# Patient Record
Sex: MALE | Race: WHITE | NOT HISPANIC OR LATINO | Employment: OTHER | URBAN - METROPOLITAN AREA
[De-identification: names, ages, dates, MRNs, and addresses within clinical notes are randomized per-mention and may not be internally consistent; named-entity substitution may affect disease eponyms.]

---

## 2017-10-03 ENCOUNTER — ALLIED HEALTH/NURSE VISIT (OUTPATIENT)
Dept: FAMILY MEDICINE | Facility: CLINIC | Age: 67
End: 2017-10-03
Payer: COMMERCIAL

## 2017-10-03 DIAGNOSIS — Z23 NEED FOR PROPHYLACTIC VACCINATION AND INOCULATION AGAINST INFLUENZA: Primary | ICD-10-CM

## 2017-10-03 DIAGNOSIS — Z23 NEED FOR TDAP VACCINATION: ICD-10-CM

## 2017-10-03 PROCEDURE — 90662 IIV NO PRSV INCREASED AG IM: CPT

## 2017-10-03 PROCEDURE — 90715 TDAP VACCINE 7 YRS/> IM: CPT

## 2017-10-03 PROCEDURE — 99207 ZZC NO CHARGE NURSE ONLY: CPT

## 2017-10-03 PROCEDURE — 90471 IMMUNIZATION ADMIN: CPT

## 2017-10-03 PROCEDURE — G0008 ADMIN INFLUENZA VIRUS VAC: HCPCS | Mod: 59

## 2017-10-03 NOTE — PROGRESS NOTES
Injectable Influenza Immunization Documentation    1.  Is the person to be vaccinated sick today?   No    2. Does the person to be vaccinated have an allergy to a component   of the vaccine?   No    3. Has the person to be vaccinated ever had a serious reaction   to influenza vaccine in the past?   No    4. Has the person to be vaccinated ever had Guillain-Barré syndrome?   No    Form completed by Rocio Ness MA

## 2017-10-03 NOTE — MR AVS SNAPSHOT
After Visit Summary   10/3/2017    Tre Swartz    MRN: [id number]           Patient Information     Date Of Birth          1950        Visit Information        Provider Department      10/3/2017 10:00 AM FL NB CMA/LPN [facility]        Today's Diagnoses     Need for prophylactic vaccination and inoculation against influenza    -  1    Need for Tdap vaccination           Follow-ups after your visit        Your next 10 appointments already scheduled     Oct 30, 2017  8:20 AM CDT   PHYSICAL with Fredy Condon MD   [facility]    [address] [place] [id number]   [phone]              Who to contact     If you have questions or need follow up information about today's clinic visit or your schedule please contact [facility] directly at [phone].  Normal or non-critical lab and imaging results will be communicated to you by Endoarthart, letter or phone within 4 business days after the clinic has received the results. If you do not hear from us within 7 days, please contact the clinic through Endoarthart or phone. If you have a critical or abnormal lab result, we will notify you by phone as soon as possible.  Submit refill requests through PictureMenu or call your pharmacy and they will forward the refill request to us. Please allow 3 business days for your refill to be completed.          Additional Information About Your Visit        MyChart Information     PictureMenu gives you secure access to your electronic health record. If you see a primary care provider, you can also send messages to your care team and make appointments. If you have questions, please call your primary care clinic.  If you do not have a primary care provider, please call [phone] and they will assist you.        Care EveryWhere ID     This is your Care EveryWhere ID. This could be used by other organizations to access your  [facility] medical records  [id number]         Blood Pressure from Last 3 Encounters:   03/04/16 110/74   02/27/15 114/76   08/02/13 118/64    Weight from Last 3 Encounters:   03/04/16 191 lb 9.6 oz (86.9 kg)   02/27/15 192 lb 9.6 oz (87.4 kg)   08/02/13 189 lb (85.7 kg)              We Performed the Following     FLU VACCINE, INCREASED ANTIGEN, PRESV FREE, AGE 65+ [26091]     TDAP VACCINE (ADACEL)     Vaccine Administration, Initial [51176]        Primary Care Provider Office Phone # Fax #    Fredy Condon [phone] [address] [place] [address]        Equal Access to Services     MARITZA SAMUEL : Hadii khadra byrneo Sokasi, waaxda luqadaha, qaybta kaalmada adeegyada, heidi britton . So [facility] [phone].    ATENCIÓN: Si habla español, tiene a arndt disposición servicios gratuitos de asistencia lingüística. Llame al [phone].    We comply with applicable federal civil rights laws and [place] laws. We do not discriminate on the basis of race, color, national origin, age, disability, sex, sexual orientation, or gender identity.            Thank you!     Thank you for choosing [facility]  for your care. Our goal is always to provide you with excellent care. Hearing back from our patients is one way we can continue to improve our services. Please take a few minutes to complete the written survey that you may receive in the mail after your visit with us. Thank you!             Your Updated Medication List - Protect others around you: Learn how to safely use, store and throw away your medicines at www.disposemymeds.org.          This list is accurate as of: 10/3/17 10:13 AM.  Always use your most recent med list.                   Brand Name Dispense Instructions for use Diagnosis    aspirin 81 MG tablet      Take 81 mg by mouth daily        MULTIVITAL Tabs     30 tablet    Take 1 tablet by mouth daily        NO ACTIVE MEDICATIONS

## 2017-10-03 NOTE — NURSING NOTE
Prior to injection verified patient identity using patient's name and date of birth.    Screening Questionnaire for Adult Immunization    Are you sick today?   Yes   Do you have allergies to medications, food, a vaccine component or latex?   No   Have you ever had a serious reaction after receiving a vaccination?   No   Do you have a long-term health problem with heart disease, lung disease, asthma, kidney disease, metabolic disease (e.g. diabetes), anemia, or other blood disorder?   No   Do you have cancer, leukemia, HIV/AIDS, or any other immune system problem?   No   In the past 3 months, have you taken medications that affect  your immune system, such as prednisone, other steroids, or anticancer drugs; drugs for the treatment of rheumatoid arthritis, Crohn s disease, or psoriasis; or have you had radiation treatments?   No   Have you had a seizure, or a brain or other nervous system problem?   No   During the past year, have you received a transfusion of blood or blood     products, or been given immune (gamma) globulin or antiviral drug?   No   For women: Are you pregnant or is there a chance you could become        pregnant during the next month?   No   Have you received any vaccinations in the past 4 weeks?   No     Immunization questionnaire answers were all negative.        Per orders of Dr. Condon, injection of Tdap and flu given by Rocio Ness. Patient instructed to remain in clinic for 15 minutes afterwards, and to report any adverse reaction to me immediately.       Screening performed by Rocio Ness on 10/3/2017 at 10:09 AM.

## 2017-10-30 ENCOUNTER — OFFICE VISIT (OUTPATIENT)
Dept: FAMILY MEDICINE | Facility: CLINIC | Age: 67
End: 2017-10-30
Payer: COMMERCIAL

## 2017-10-30 VITALS
TEMPERATURE: 96.3 F | BODY MASS INDEX: 25.74 KG/M2 | SYSTOLIC BLOOD PRESSURE: 120 MMHG | RESPIRATION RATE: 16 BRPM | WEIGHT: 179.8 LBS | HEIGHT: 70 IN | HEART RATE: 72 BPM | DIASTOLIC BLOOD PRESSURE: 78 MMHG

## 2017-10-30 DIAGNOSIS — Z12.11 SPECIAL SCREENING FOR MALIGNANT NEOPLASMS, COLON: ICD-10-CM

## 2017-10-30 DIAGNOSIS — Z13.1 SCREENING FOR DIABETES MELLITUS: ICD-10-CM

## 2017-10-30 DIAGNOSIS — Z13.6 CARDIOVASCULAR SCREENING; LDL GOAL LESS THAN 130: ICD-10-CM

## 2017-10-30 DIAGNOSIS — Z00.00 MEDICARE ANNUAL WELLNESS VISIT, SUBSEQUENT: Primary | ICD-10-CM

## 2017-10-30 LAB
CHOLEST SERPL-MCNC: 210 MG/DL
GLUCOSE SERPL-MCNC: 98 MG/DL (ref 70–99)
HDLC SERPL-MCNC: 51 MG/DL
LDLC SERPL CALC-MCNC: 138 MG/DL
NONHDLC SERPL-MCNC: 159 MG/DL
TRIGL SERPL-MCNC: 104 MG/DL

## 2017-10-30 PROCEDURE — 36415 COLL VENOUS BLD VENIPUNCTURE: CPT | Performed by: FAMILY MEDICINE

## 2017-10-30 PROCEDURE — 99397 PER PM REEVAL EST PAT 65+ YR: CPT | Performed by: FAMILY MEDICINE

## 2017-10-30 PROCEDURE — 82947 ASSAY GLUCOSE BLOOD QUANT: CPT | Performed by: FAMILY MEDICINE

## 2017-10-30 PROCEDURE — 80061 LIPID PANEL: CPT | Performed by: FAMILY MEDICINE

## 2017-10-30 ASSESSMENT — PAIN SCALES - GENERAL: PAINLEVEL: NO PAIN (0)

## 2017-10-30 NOTE — PROGRESS NOTES
SUBJECTIVE:   Tre Swartz is a 66 year old male who presents for Preventive Visit.    Mr. Swartz has no specific or acute complaints today. He is due for yearly FIT test.    Dyslipidemia follow-up  Labs from March 2016 showed elevated total cholesterol and LDL. He decided not to start a statin and instead focus on diet and exercise.  He currently lifts weights at the fitness center for 5 days a week, 1 hour per visit. Has also been staying active at home, building new pole shed. He has been watching his diet, eating more oatmeal and vegetables. He would like to repeat his lipid labs today, to see if lifestyle changes have made a difference or if he should start medication.    Are you in the first 12 months of your Medicare Part B coverage?  No    Healthy Habits:    Do you get at least three servings of calcium containing foods daily (dairy, green leafy vegetables, etc.)? yes    Amount of exercise or daily activities, outside of work: 5 day(s) per week    Problems taking medications regularly not applicable    Medication side effects: No    Have you had an eye exam in the past two years? yes    Do you see a dentist twice per year? yes    Do you have sleep apnea, excessive snoring or daytime drowsiness?no    COGNITIVE SCREEN  1) Repeat 3 items (Banana, Sunrise, Chair)    2) Clock draw: NORMAL  3) 3 item recall: Recalls 3 objects  Results: 3 items recalled: COGNITIVE IMPAIRMENT LESS LIKELY  Mini-CogTM Copyright S Vinny. Licensed by the author for use in [facility]; reprinted with permission (ada@.[facility]). All rights reserved.      Reviewed and updated as needed this visit by clinical staffTobacco  Allergies  Med Hx  Surg Hx  Fam Hx  Soc Hx      Reviewed and updated as needed this visit by Provider        Social History   Substance Use Topics     Smoking status: Never Smoker     Smokeless tobacco: Never Used     Alcohol use Yes      Comment: rarely     The patient does not drink >3 drinks  "per day nor >7 drinks per week.    Today's PHQ-2 Score:   PHQ-2 ( 1999 Pfizer) 10/30/2017 3/4/2016   Q1: Little interest or pleasure in doing things 0 0   Q2: Feeling down, depressed or hopeless 0 0   PHQ-2 Score 0 0     Do you feel safe in your environment - Yes  Do you have a Health Care Directive?: No: Advance care planning reviewed with patient; information given to patient to review.    Current providers sharing in care for this patient include: Patient Care Team:  Fredy Condon MD as PCP - General (Family Practice)      [facility]: [facility]    Ability to successfully perform activities of daily living: Yes, no assistance needed     Fall risk:  Fallen 2 or more times in the past year?: No  Any fall with injury in the past year?: No      Home safety:  lack of grab bars in the bathroom    The following health maintenance items are reviewed in Epic and correct as of today:Health Maintenance   Topic Date Due     ADVANCE DIRECTIVE PLANNING Q5 YRS  11/25/2004     FALL RISK ASSESSMENT  11/25/2014     AORTIC ANEURYSM SCREENING (SYSTEM ASSIGNED)  11/25/2014     FIT Q1 YR  03/17/2017     LIPID SCREEN Q5 YR MALE (SYSTEM ASSIGNED)  03/04/2021     TETANUS IMMUNIZATION (SYSTEM ASSIGNED)  10/03/2027     INFLUENZA VACCINE (SYSTEM ASSIGNED)  Completed     PNEUMOCOCCAL  Completed     HEPATITIS C SCREENING  Completed     Completed all immunizations    ROS:  Constitutional: Intentional 12 lb weight loss in past 1.5 years. No fever, chills, or change in appetite.  Eyes: Wears glasses. No blurry or change in vision  ENT: \"I need hearing aids\", with tinnitus; no change in hearing, sore throat  Respiratory: Non-productive cough; No shortness of breath, wheezing, cough, hemoptysis, sputum  Cardiovascular: No chest pain, orthopnea, dyspnea on exertion, lower extremity edema, syncope, palpitations  Gastrointestinal: No nausea, heartburn, diarrhea, constipation, abdominal pain  Genitourinary: No urinary " "urgency or change in frequency  Endocrine: No increased thirst, polyuria  Skin: No rashes, sores  Musculoskeletal: No arthralgias or muscle weakness  Allergic: No known allergies  Neurologic: No headache, dizziness  Psychiatric: No changes in sleep, mood, interest, or concentration    OBJECTIVE:   /78 (BP Location: Right arm, Patient Position: Chair, Cuff Size: Adult Large)  Pulse 72  Temp 96.3  F (35.7  C) (Tympanic)  Resp 16  Ht 5' 9.5\" (1.765 m)  Wt 179 lb 12.8 oz (81.6 kg)  BMI 26.17 kg/m2 Estimated body mass index is 26.17 kg/(m^2) as calculated from the following:    Height as of this encounter: 5' 9.5\" (1.765 m).    Weight as of this encounter: 179 lb 12.8 oz (81.6 kg).  EXAM:   Constitutional: well appearing, pleasant, in no acute distress  Head: normocephalic, no evidence of trauma  Eyes: PERRLA bilaterally, EOMs intact bilaterally, anicteric sclera  ENT: Cerumen in bilateral ears, left TM visible and non-injected, moist mucous membranes, non-erythematous oropharynx, no lymphadenopathy  Respiratory: lung sounds clear to auscultation and equal bilaterally, no wheezes, crackles, or rhonchi  Cardiovascular: regular rate and rhythm, normal S1 and S2, no rubs, murmurs or gallops, no JVD, peripheral pulses strong and equal bilaterally, no peripheral edema  GI: soft, non-distended abdomen, non-tender to palpation, normoactive bowel sounds  Skin: warm and dry, mottled hyperpigmentation on bilateral lower legs  Musculoskeletal: normal gait   Neuro: alert and oriented to person, time, and place, CN II-XII grossly intact  Psych: appropriate mood and affect, cooperative with exam  ASSESSMENT / PLAN:   See Dr. Condon's Assessment and Plan below.  Eric Estrada, MS3    (Z00.00) Medicare annual wellness visit, subsequent  (primary encounter diagnosis)    (Z13.6) CARDIOVASCULAR SCREENING; LDL GOAL LESS THAN 130  Comment: Repeat labs today with lifestyle changes over past year  Plan: Lipid panel reflex to direct LDL " "Fasting        Fasting blood tests today    (Z13.1) Screening for diabetes mellitus  Comment:   Plan: Glucose          (Z12.11) Special screening for malignant neoplasms, colon  Comment:   Plan: Fecal colorectal cancer screen (FIT)        Complete FIT colon cancer screening test and send in the mail.       End of Life Planning:  Patient currently has an advanced directive: No.  I have verified the patient's ablity to prepare an advanced directive/make health care decisions.  Literature was provided to assist patient in preparing an advanced directive.    COUNSELING:  Reviewed preventive health counseling, as reflected in patient instructions  Special attention given to:       Colon cancer screening       Prostate cancer screening    Estimated body mass index is 26.17 kg/(m^2) as calculated from the following:    Height as of this encounter: 5' 9.5\" (1.765 m).    Weight as of this encounter: 179 lb 12.8 oz (81.6 kg).     reports that he has never smoked. He has never used smokeless tobacco.    Appropriate preventive services were discussed with this patient, including applicable screening as appropriate for cardiovascular disease, diabetes, osteopenia/osteoporosis, and glaucoma.  As appropriate for age/gender, discussed screening for colorectal cancer, prostate cancer, breast cancer, and cervical cancer. Checklist reviewing preventive services available has been given to the patient.    Reviewed patients plan of care and provided an AVS. The Basic Care Plan (routine screening as documented in Health Maintenance) for Tre meets the Care Plan requirement. This Care Plan has been established and reviewed with the Patient.    Counseling Resources:  ATP IV Guidelines  Pooled Cohorts Equation Calculator  Breast Cancer Risk Calculator  FRAX Risk Assessment  ICSI Preventive Guidelines  Dietary Guidelines for Americans, 2010  USDA's MyPlate  ASA Prophylaxis  Lung CA Screening    I did see and examine patient with Eric Estrada " today.   PALMA WARNER MD   ======================================  The 10-year ASCVD risk score (Kalyn RAMOS Jr, et al., 2013) is: 14.1%    Values used to calculate the score:      Age: 66 years      Sex: Male      Is Non- : No      Diabetic: No      Tobacco smoker: No      Systolic Blood Pressure: 120 mmHg      Is BP treated: No      HDL Cholesterol: 51 mg/dL      Total Cholesterol: 226 mg/dL

## 2017-10-30 NOTE — PATIENT INSTRUCTIONS
Preventive Health Recommendations:       Male Ages 65 and over    Yearly exam:             See your health care provider every year in order to  o   Review health changes.   o   Discuss preventive care.    o   Review your medicines if your doctor has prescribed any.    Talk with your health care provider about whether you should have a test to screen for prostate cancer (PSA).    Every 3 years, have a diabetes test (fasting glucose). If you are at risk for diabetes, you should have this test more often.    Every 5 years, have a cholesterol test. Have this test more often if you are at risk for high cholesterol or heart disease.     Every 10 years, have a colonoscopy. Or, have a yearly FIT test (stool test). These exams will check for colon cancer.    Talk to with your health care provider about screening for Abdominal Aortic Aneurysm if you have a family history of AAA or have a history of smoking.  Shots:     Get a flu shot each year.     Get a tetanus shot every 10 years.     Talk to your doctor about your pneumonia vaccines. There are now two you should receive - Pneumovax (PPSV 23) and Prevnar (PCV 13).    Talk to your doctor about a shingles vaccine.     Talk to your doctor about the hepatitis B vaccine.  Nutrition:     Eat at least 5 servings of fruits and vegetables each day.     Eat whole-grain bread, whole-wheat pasta and brown rice instead of white grains and rice.     Talk to your doctor about Calcium and Vitamin D.   Lifestyle    Exercise for at least 150 minutes a week (30 minutes a day, 5 days a week). This will help you control your weight and prevent disease.     Limit alcohol to one drink per day.     No smoking.     Wear sunscreen to prevent skin cancer.     See your dentist every six months for an exam and cleaning.     See your eye doctor every 1 to 2 years to screen for conditions such as glaucoma, macular degeneration and cataracts.    ASSESSMENT / PLAN:   (Z00.00) Medicare annual wellness  visit, subsequent  (primary encounter diagnosis)    (Z13.6) CARDIOVASCULAR SCREENING; LDL GOAL LESS THAN 130  Comment:   Plan: Lipid panel reflex to direct LDL Fasting        Fasting blood tests today    (Z13.1) Screening for diabetes mellitus  Comment:   Plan: Glucose          (Z12.11) Special screening for malignant neoplasms, colon  Comment:   Plan: Fecal colorectal cancer screen (FIT)        Complete FIT colon cancer screening test and send in the mail.

## 2017-10-30 NOTE — MR AVS SNAPSHOT
After Visit Summary   10/30/2017    Tre Swartz    MRN: [id number]           Patient Information     Date Of Birth          1950        Visit Information        Provider Department      10/30/2017 8:20 AM Fredy Condon MD [facility]        Today's Diagnoses     Medicare annual wellness visit, subsequent    -  1    CARDIOVASCULAR SCREENING; LDL GOAL LESS THAN 130        Screening for diabetes mellitus        Special screening for malignant neoplasms, colon          Care Instructions      Preventive Health Recommendations:       Male Ages 65 and over    Yearly exam:             See your health care provider every year in order to  o   Review health changes.   o   Discuss preventive care.    o   Review your medicines if your doctor has prescribed any.    Talk with your health care provider about whether you should have a test to screen for prostate cancer (PSA).    Every 3 years, have a diabetes test (fasting glucose). If you are at risk for diabetes, you should have this test more often.    Every 5 years, have a cholesterol test. Have this test more often if you are at risk for high cholesterol or heart disease.     Every 10 years, have a colonoscopy. Or, have a yearly FIT test (stool test). These exams will check for colon cancer.    Talk to with your health care provider about screening for Abdominal Aortic Aneurysm if you have a family history of AAA or have a history of smoking.  Shots:     Get a flu shot each year.     Get a tetanus shot every 10 years.     Talk to your doctor about your pneumonia vaccines. There are now two you should receive - Pneumovax (PPSV 23) and Prevnar (PCV 13).    Talk to your doctor about a shingles vaccine.     Talk to your doctor about the hepatitis B vaccine.  Nutrition:     Eat at least 5 servings of fruits and vegetables each day.     Eat whole-grain bread, whole-wheat pasta and brown rice instead of white grains and rice.     Talk  to your doctor about Calcium and Vitamin D.   Lifestyle    Exercise for at least 150 minutes a week (30 minutes a day, 5 days a week). This will help you control your weight and prevent disease.     Limit alcohol to one drink per day.     No smoking.     Wear sunscreen to prevent skin cancer.     See your dentist every six months for an exam and cleaning.     See your eye doctor every 1 to 2 years to screen for conditions such as glaucoma, macular degeneration and cataracts.    ASSESSMENT / PLAN:   (Z00.00) Medicare annual wellness visit, subsequent  (primary encounter diagnosis)    (Z13.6) CARDIOVASCULAR SCREENING; LDL GOAL LESS THAN 130  Comment:   Plan: Lipid panel reflex to direct LDL Fasting        Fasting blood tests today    (Z13.1) Screening for diabetes mellitus  Comment:   Plan: Glucose          (Z12.11) Special screening for malignant neoplasms, colon  Comment:   Plan: Fecal colorectal cancer screen (FIT)        Complete FIT colon cancer screening test and send in the mail.            Follow-ups after your visit        Future tests that were ordered for you today     Open Future Orders        Priority Expected Expires Ordered    Fecal colorectal cancer screen (FIT) Routine 11/20/2017 1/22/2018 10/30/2017            Who to contact     If you have questions or need follow up information about today's clinic visit or your schedule please contact [facility] directly at [phone].  Normal or non-critical lab and imaging results will be communicated to you by InstaJobhart, letter or phone within 4 business days after the clinic has received the results. If you do not hear from us within 7 days, please contact the clinic through InstaJobhart or phone. If you have a critical or abnormal lab result, we will notify you by phone as soon as possible.  Submit refill requests through Gazzang or call your pharmacy and they will forward the refill request to us. Please allow 3 business days for your refill to  "be completed.          Additional Information About Your Visit        RotaBanhart Information     [employer] gives you secure access to your electronic health record. If you see a primary care provider, you can also send messages to your care team and make appointments. If you have questions, please call your primary care clinic.  If you do not have a primary care provider, please call [phone] and they will assist you.        Care EveryWhere ID     This is your Care EveryWhere ID. This could be used by other organizations to access your [facility] medical records  [id number]        Your Vitals Were     Pulse Temperature Respirations Height BMI (Body Mass Index)       72 96.3  F (35.7  C) (Tympanic) 16 5' 9.5\" (1.765 m) 26.17 kg/m2        Blood Pressure from Last 3 Encounters:   10/30/17 120/78   03/04/16 110/74   02/27/15 114/76    Weight from Last 3 Encounters:   10/30/17 179 lb 12.8 oz (81.6 kg)   03/04/16 191 lb 9.6 oz (86.9 kg)   02/27/15 192 lb 9.6 oz (87.4 kg)              We Performed the Following     Glucose     Lipid panel reflex to direct LDL Fasting        Primary Care Provider Office Phone # Fax #    Fredy Condon [phone] [address] [place] [address]        Equal Access to Services     MARITZA SAMUEL : Hadii khadra ku hadasho Soomaali, waaxda luqadaha, qaybta kaalmada adeegyada, heidi lewis. So [facility] [phone].    ATENCIÓN: Si habla español, tiene a arndt disposición servicios gratuitos de asistencia lingüística. Llame al [phone].    We comply with applicable federal civil rights laws and [place] laws. We do not discriminate on the basis of race, color, national origin, age, disability, sex, sexual orientation, or gender identity.            Thank you!     Thank you for choosing [facility]  for your care. Our goal is always to provide you with excellent care. Hearing back from our patients is one way we can continue " to improve our services. Please take a few minutes to complete the written survey that you may receive in the mail after your visit with us. Thank you!             Your Updated Medication List - Protect others around you: Learn how to safely use, store and throw away your medicines at www.disposemymeds.org.          This list is accurate as of: 10/30/17  9:13 AM.  Always use your most recent med list.                   Brand Name Dispense Instructions for use Diagnosis    aspirin 81 MG tablet      Take 81 mg by mouth daily        MULTIVITAL Tabs     30 tablet    Take 1 tablet by mouth daily        NO ACTIVE MEDICATIONS

## 2017-10-30 NOTE — NURSING NOTE
"Chief Complaint   Patient presents with     Wellness Visit       Initial /78 (BP Location: Right arm, Patient Position: Chair, Cuff Size: Adult Large)  Pulse 72  Temp 96.3  F (35.7  C) (Tympanic)  Resp 16  Ht 5' 9.5\" (1.765 m)  Wt 179 lb 12.8 oz (81.6 kg)  BMI 26.17 kg/m2 Estimated body mass index is 26.17 kg/(m^2) as calculated from the following:    Height as of this encounter: 5' 9.5\" (1.765 m).    Weight as of this encounter: 179 lb 12.8 oz (81.6 kg).  Medication Reconciliation: complete    Health Maintenance that is potentially due pending provider review:  Colonoscopy/FIT    Gave pt phone number/pended order to schedule mammo and/or colonoscopy(or FIT)    Is there anyone who you would like to be able to receive your results? YesSharon  If yes have patient fill out KACY  Hedy Cardenas CMA    "

## 2017-10-31 PROCEDURE — 82274 ASSAY TEST FOR BLOOD FECAL: CPT | Performed by: FAMILY MEDICINE

## 2017-10-31 NOTE — PROGRESS NOTES
Luis Toledo,   Your LDL and total cholesterol are again high, a little improved.  The blood glucose, a test for diabetes mellitus is in the normal range.   You have mildly increased risk for heart attacks.  You could reduce the risk a little by taking cholesterol lowering medication like atorvastatin 20mg daily.   Let us know if you would like to take medication for cholesterol.     PALMA WARNER MD      The 10-year ASCVD risk score (Kalynevgeny RAMOS Jr, et al., 2013) is: 13.5%    Values used to calculate the score:      Age: 66 years      Sex: Male      Is Non- : No      Diabetic: No      Tobacco smoker: No      Systolic Blood Pressure: 120 mmHg      Is BP treated: No      HDL Cholesterol: 51 mg/dL      Total Cholesterol: 210 mg/dL

## 2017-11-02 DIAGNOSIS — Z12.11 SPECIAL SCREENING FOR MALIGNANT NEOPLASMS, COLON: ICD-10-CM

## 2017-11-02 LAB — HEMOCCULT STL QL IA: NEGATIVE

## 2018-11-07 ENCOUNTER — OFFICE VISIT (OUTPATIENT)
Dept: FAMILY MEDICINE | Facility: CLINIC | Age: 68
End: 2018-11-07
Payer: COMMERCIAL

## 2018-11-07 VITALS
HEART RATE: 84 BPM | TEMPERATURE: 97.2 F | WEIGHT: 189.2 LBS | DIASTOLIC BLOOD PRESSURE: 78 MMHG | RESPIRATION RATE: 14 BRPM | HEIGHT: 69 IN | SYSTOLIC BLOOD PRESSURE: 117 MMHG | BODY MASS INDEX: 28.02 KG/M2

## 2018-11-07 DIAGNOSIS — Z23 NEED FOR PROPHYLACTIC VACCINATION AND INOCULATION AGAINST INFLUENZA: ICD-10-CM

## 2018-11-07 DIAGNOSIS — Z13.6 CARDIOVASCULAR SCREENING; LDL GOAL LESS THAN 130: ICD-10-CM

## 2018-11-07 DIAGNOSIS — Z12.11 SPECIAL SCREENING FOR MALIGNANT NEOPLASMS, COLON: ICD-10-CM

## 2018-11-07 DIAGNOSIS — Z00.00 MEDICARE ANNUAL WELLNESS VISIT, SUBSEQUENT: Primary | ICD-10-CM

## 2018-11-07 DIAGNOSIS — Z13.1 SCREENING FOR DIABETES MELLITUS: ICD-10-CM

## 2018-11-07 LAB
CHOLEST SERPL-MCNC: 203 MG/DL
GLUCOSE SERPL-MCNC: 105 MG/DL (ref 70–99)
HDLC SERPL-MCNC: 43 MG/DL
LDLC SERPL CALC-MCNC: 109 MG/DL
NONHDLC SERPL-MCNC: 160 MG/DL
TRIGL SERPL-MCNC: 254 MG/DL

## 2018-11-07 PROCEDURE — 90662 IIV NO PRSV INCREASED AG IM: CPT | Performed by: FAMILY MEDICINE

## 2018-11-07 PROCEDURE — 99397 PER PM REEVAL EST PAT 65+ YR: CPT | Mod: 25 | Performed by: FAMILY MEDICINE

## 2018-11-07 PROCEDURE — 82947 ASSAY GLUCOSE BLOOD QUANT: CPT | Performed by: FAMILY MEDICINE

## 2018-11-07 PROCEDURE — 36415 COLL VENOUS BLD VENIPUNCTURE: CPT | Performed by: FAMILY MEDICINE

## 2018-11-07 PROCEDURE — G0008 ADMIN INFLUENZA VIRUS VAC: HCPCS | Performed by: FAMILY MEDICINE

## 2018-11-07 PROCEDURE — 80061 LIPID PANEL: CPT | Performed by: FAMILY MEDICINE

## 2018-11-07 ASSESSMENT — ACTIVITIES OF DAILY LIVING (ADL): CURRENT_FUNCTION: NO ASSISTANCE NEEDED

## 2018-11-07 ASSESSMENT — ENCOUNTER SYMPTOMS
PALPITATIONS: 0
WEAKNESS: 0
DYSURIA: 0
SORE THROAT: 0
ARTHRALGIAS: 0
FREQUENCY: 0
COUGH: 0
HEARTBURN: 0
SHORTNESS OF BREATH: 0
NAUSEA: 0
JOINT SWELLING: 0
HEMATOCHEZIA: 0
EYE PAIN: 0
ABDOMINAL PAIN: 0
HEMATURIA: 0
HEADACHES: 0
CHILLS: 0
DIARRHEA: 0
CONSTIPATION: 0
DIZZINESS: 0
PARESTHESIAS: 0

## 2018-11-07 ASSESSMENT — PAIN SCALES - GENERAL: PAINLEVEL: NO PAIN (0)

## 2018-11-07 NOTE — PROGRESS NOTES
"SUBJECTIVE:   Tre Swartz is a 67 year old male who presents for Preventive Visit.  Are you in the first 12 months of your Medicare coverage?  No    No new health concerns.     Annual Wellness Visit     In general, how would you rate your overall health?  Good    Frequency of exercise:  4-5 days/week    Duration of exercise:  45-60 minutes    Do you usually eat at least 4 servings of fruit and vegetables a day, include whole grains    & fiber and avoid regularly eating high fat or \"junk\" foods?  Yes    Taking medications regularly:  Not Applicable    Medication side effects:  Not applicable    Ability to successfully perform activities of daily living:  No assistance needed    Home Safety:  No safety concerns identified    Hearing Impairment:  Difficulty following a conversation in a noisy restaurant or crowded room, need to ask people to speak up or repeat themselves and difficulty understanding soft or whispered speech    In the past 6 months, have you been bothered by leaking of urine?  No    In general, how would you rate your overall mental or emotional health?  Good    PHQ-2 Total Score: 0    Additional concerns today:  No  Exercise-gym [employer].  Treadmill and exercise equipment.     Fall risk:  Fallen 2 or more times in the past year?: No  Any fall with injury in the past year?: No    COGNITIVE SCREEN  1) Repeat 3 items (Leader, Season, Table)    2) Clock draw: NORMAL  3) 3 item recall: Recalls 3 objects  Results: 3 items recalled: COGNITIVE IMPAIRMENT LESS LIKELY    Mini-CogTM Copyright S Vinny. Licensed by the author for use in [facility]; reprinted with permission (ada@.[facility]). All rights reserved.        Social History   Substance Use Topics     Smoking status: Never Smoker     Smokeless tobacco: Never Used     Alcohol use Yes      Comment: rarely     Alcohol Use 11/7/2018   If you drink alcohol do you typically have greater than 3 drinks per day OR greater than 7 drinks per " week? Not Applicable       Do you feel safe in your environment - Yes    Do you have a Health Care Directive?: Yes: Patient states has Advance Directive and will bring in a copy to clinic.    Current providers sharing in care for this patient include:   Patient Care Team:  Fredy Condon MD as PCP - General (Family Practice)   No specialists    The following health maintenance items are reviewed in Epic and correct as of today:  Health Maintenance   Topic Date Due     ADVANCE DIRECTIVE PLANNING Q5 YRS  11/25/2004     AORTIC ANEURYSM SCREENING (SYSTEM ASSIGNED)  11/25/2014     INFLUENZA VACCINE (1) 09/01/2018     FALL RISK ASSESSMENT  10/30/2018     PHQ-2 Q1 YR  10/30/2018     FIT Q1 YR  10/31/2018     LIPID SCREEN Q5 YR MALE (SYSTEM ASSIGNED)  10/30/2022     TETANUS IMMUNIZATION (SYSTEM ASSIGNED)  10/03/2027     PNEUMOCOCCAL  Completed     HEPATITIS C SCREENING  Completed     Patient Active Problem List    Diagnosis Date Noted     CARDIOVASCULAR SCREENING; LDL GOAL LESS THAN 130 10/31/2010     Priority: Medium     Esophageal reflux 08/20/2007     Priority: Medium      Family history:  CV disease: mother at older age  Prostate cancer: no  Colon cancer: no    Multivitamin or Vit D use: daily MVI    Vaccines:current     Past Colon cancer screening:FIT a year ago    Review of Systems   Constitutional: Negative for chills.   HENT: Positive for hearing loss. Negative for congestion, ear pain and sore throat.    Eyes: Negative for pain and visual disturbance.   Respiratory: Negative for cough and shortness of breath.    Cardiovascular: Negative for chest pain, palpitations and peripheral edema.   Gastrointestinal: Negative for abdominal pain, constipation, diarrhea, heartburn, hematochezia and nausea.   Genitourinary: Negative for discharge, dysuria, frequency, genital sores, hematuria, impotence and urgency.   Musculoskeletal: Negative for arthralgias and joint swelling.   Skin: Negative for rash.   Neurological:  "Negative for dizziness, weakness, headaches and paresthesias.   Psychiatric/Behavioral: Negative for mood changes.   Physical Exam    OBJECTIVE:                                                    OBJECTIVE:Blood pressure 117/78, pulse 84, temperature 97.2  F (36.2  C), temperature source Oral, resp. rate 14, height 5' 9.25\" (1.759 m), weight 189 lb 3.2 oz (85.8 kg). BMI=Body mass index is 27.74 kg/(m^2).  GENERAL APPEARANCE ADULT: Alert, no acute distress  EYES: PERRL, EOM normal, conjunctiva and lids normal  HENT: Ears and TMs normal, oral mucosa and posterior oropharynx normal.  Upper denture plate  NECK: No adenopathy,masses or thyromegaly  RESP: lungs clear to auscultation   CV: normal rate, regular rhythm, no murmur or gallop  ABDOMEN: soft, no organomegaly, masses or tenderness  MS: extremities normal, no peripheral edema    ASSESSMENT/PLAN:                                                    Lifestyle recommendations:continue current healthy lifestyle efforts including regular exercise and keeping a good weight  The following exams/tests were recommended and discussed for health maintenance:  FIT testing to check for hidden blood in the stool is a colon cancer screening test which should be done once yearly if normal.  If abnormal, a colonoscopy is recommended.    Prostate cancer screening is optional starting at age 50 if normal risk, age 40 or 45 for high risk men (strong family history or blacks.)  Screening can include digital rectal exam and PSA blood test.  Prostate cancer screening is not recommended for older men, those age 70 and older or with anticipated lifespan <10 years.  The expert group USPSTF rcommmends against any PSA prostate cancer screening.        ICD-10-CM    1. Medicare annual wellness visit, subsequent Z00.00    2. Need for prophylactic vaccination and inoculation against influenza Z23 FLU VACCINE, INCREASED ANTIGEN, PRESV FREE, AGE 65+ [04693]     Vaccine Administration, Initial " "[49294]   3. Special screening for malignant neoplasms, colon Z12.11 Fecal colorectal cancer screen (FIT)   4. CARDIOVASCULAR SCREENING; LDL GOAL LESS THAN 130 Z13.6 Lipid panel reflex to direct LDL Fasting   5. Screening for diabetes mellitus Z13.1 Glucose      Non-fasting blood tests today.   Complete FIT colon cancer screening test and send in the mail.      End of Life Planning:  Patient currently has an advanced directive: Yes.  Practitioner is supportive of decision.    COUNSELING:  Reviewed preventive health counseling, as reflected in patient instructions  Special attention given to:       Colon cancer screening       Prostate cancer screening    BP Readings from Last 1 Encounters:   11/07/18 117/78     Estimated body mass index is 27.74 kg/(m^2) as calculated from the following:    Height as of this encounter: 5' 9.25\" (1.759 m).    Weight as of this encounter: 189 lb 3.2 oz (85.8 kg).       reports that he has never smoked. He has never used smokeless tobacco.      Appropriate preventive services were discussed with this patient, including applicable screening as appropriate for cardiovascular disease, diabetes, osteopenia/osteoporosis, and glaucoma.  As appropriate for age/gender, discussed screening for colorectal cancer, prostate cancer, breast cancer, and cervical cancer. Checklist reviewing preventive services available has been given to the patient.    Reviewed patients plan of care and provided an AVS. The Basic Care Plan (routine screening as documented in Health Maintenance) for Tre meets the Care Plan requirement. This Care Plan has been established and reviewed with the Patient.    Counseling Resources:  ATP IV Guidelines  Pooled Cohorts Equation Calculator  Breast Cancer Risk Calculator  FRAX Risk Assessment  ICSI Preventive Guidelines  Dietary Guidelines for Americans, 2010  USDA's MyPlate  ASA Prophylaxis  Lung CA Screening    Fredy Condon MD  [facility] " [place]  =====================  The 10-year ASCVD risk score (Kalyn RAMOS Jr, et al., 2013) is: 13.9%    Values used to calculate the score:      Age: 67 years      Sex: Male      Is Non- : No      Diabetic: No      Tobacco smoker: No      Systolic Blood Pressure: 117 mmHg      Is BP treated: No      HDL Cholesterol: 51 mg/dL      Total Cholesterol: 210 mg/dL

## 2018-11-07 NOTE — PROGRESS NOTES
"SUBJECTIVE:   CC: Tre Swartz is an 67 year old male who presents for preventative health visit.     Annual Wellness Visit     In general, how would you rate your overall health?  Good    Frequency of exercise:  4-5 days/week    Duration of exercise:  45-60 minutes    Do you usually eat at least 4 servings of fruit and vegetables a day, include whole grains    & fiber and avoid regularly eating high fat or \"junk\" foods?  Yes    Taking medications regularly:  Not Applicable    Medication side effects:  Not applicable    Ability to successfully perform activities of daily living:  No assistance needed    Home Safety:  No safety concerns identified    Hearing Impairment:  Difficulty following a conversation in a noisy restaurant or crowded room, need to ask people to speak up or repeat themselves and difficulty understanding soft or whispered speech    In the past 6 months, have you been bothered by leaking of urine?  No    In general, how would you rate your overall mental or emotional health?  Good    PHQ-2 Total Score: 0    Additional concerns today:  No    Cognitive test:  Clock: Normal  Recalled 3 Items    Today's PHQ-2 Score:   PHQ-2 ( 1999 Pfizer) 11/7/2018   Q1: Little interest or pleasure in doing things 0   Q2: Feeling down, depressed or hopeless 0   PHQ-2 Score 0   Q1: Little interest or pleasure in doing things Not at all   Q2: Feeling down, depressed or hopeless Not at all   PHQ-2 Score 0       Abuse: Current or Past(Physical, Sexual or Emotional)- No  Do you feel safe in your environment - Yes    Social History   Substance Use Topics     Smoking status: Never Smoker     Smokeless tobacco: Never Used     Alcohol use Yes      Comment: rarely     Alcohol Use 11/7/2018   If you drink alcohol do you typically have greater than 3 drinks per day OR greater than 7 drinks per week? Not Applicable       Last PSA:   PSA   Date Value Ref Range Status   03/04/2016 0.13 0 - 4 ug/L Final       Reviewed orders with " "patient. Reviewed health maintenance and updated orders accordingly - Yes  {Chronicprobdata (Optional):828220}    Reviewed and updated as needed this visit by clinical staff  Tobacco  Allergies  Meds  Med Hx  Surg Hx  Fam Hx  Soc Hx        Reviewed and updated as needed this visit by Provider        {HISTORY OPTIONS (Optional):828102}    Review of Systems   Constitutional: Negative for chills.   HENT: Positive for hearing loss. Negative for congestion, ear pain and sore throat.    Eyes: Negative for pain and visual disturbance.   Respiratory: Negative for cough and shortness of breath.    Cardiovascular: Negative for chest pain, palpitations and peripheral edema.   Gastrointestinal: Negative for abdominal pain, constipation, diarrhea, heartburn, hematochezia and nausea.   Genitourinary: Negative for discharge, dysuria, frequency, genital sores, hematuria, impotence and urgency.   Musculoskeletal: Negative for arthralgias and joint swelling.   Skin: Negative for rash.   Neurological: Negative for dizziness, weakness, headaches and paresthesias.   Psychiatric/Behavioral: Negative for mood changes.     {MALE ROS (Optional):417112::\"CONSTITUTIONAL: NEGATIVE for fever, chills, change in weight\",\"INTEGUMENTARY/SKIN: NEGATIVE for worrisome rashes, moles or lesions\",\"EYES: NEGATIVE for vision changes or irritation\",\"ENT: NEGATIVE for ear, mouth and throat problems\",\"RESP: NEGATIVE for significant cough or SOB\",\"CV: NEGATIVE for chest pain, palpitations or peripheral edema\",\"GI: NEGATIVE for nausea, abdominal pain, heartburn, or change in bowel habits\",\" male: negative for dysuria, hematuria, decreased urinary stream, erectile dysfunction, urethral discharge\",\"MUSCULOSKELETAL: NEGATIVE for significant arthralgias or myalgia\",\"NEURO: NEGATIVE for weakness, dizziness or paresthesias\",\"PSYCHIATRIC: NEGATIVE for changes in mood or affect\"}    OBJECTIVE:   /78 (BP Location: Right arm, Patient Position: Sitting, Cuff " "Size: Adult Large)  Pulse 84  Temp 97.2  F (36.2  C) (Oral)  Resp 14  Ht 5' 9.25\" (1.759 m)  Wt 189 lb 3.2 oz (85.8 kg)  BMI 27.74 kg/m2    Physical Exam  {Exam Choices (Optional):056647}    {Diagnostic Test Results (Optional):080546::\"Diagnostic Test Results:\",\"none \"}    ASSESSMENT/PLAN:   {Diag Picklist:764246}    COUNSELING:   {MALE COUNSELING MESSAGES:621516::\"Reviewed preventive health counseling, as reflected in patient instructions\"}    BP Readings from Last 1 Encounters:   11/07/18 117/78     Estimated body mass index is 27.74 kg/(m^2) as calculated from the following:    Height as of this encounter: 5' 9.25\" (1.759 m).    Weight as of this encounter: 189 lb 3.2 oz (85.8 kg).    {BP Counseling- Complete if BP >= 120/80  (Optional):226902}  {Weight Management Plan (ACO) Complete if BMI is abnormal-  Ages 18-64  BMI >24.9.  Age 65+ with BMI <23 or >30 (Optional):461112}     reports that he has never smoked. He has never used smokeless tobacco.  {Tobacco Cessation -- Complete if patient is a smoker (Optional):726863}    Counseling Resources:  ATP IV Guidelines  Pooled Cohorts Equation Calculator  FRAX Risk Assessment  ICSI Preventive Guidelines  Dietary Guidelines for Americans, 2010  USDA's MyPlate  ASA Prophylaxis  Lung CA Screening    Fredy Condon MD  [facility]  "

## 2018-11-07 NOTE — MR AVS SNAPSHOT
After Visit Summary   11/7/2018    Tre Swartz    MRN: [id number]           Patient Information     Date Of Birth          1950        Visit Information        Provider Department      11/7/2018 10:20 AM Fredy Condon MD [facility]        Today's Diagnoses     Medicare annual wellness visit, subsequent    -  1    Need for prophylactic vaccination and inoculation against influenza        Special screening for malignant neoplasms, colon        CARDIOVASCULAR SCREENING; LDL GOAL LESS THAN 130        Screening for diabetes mellitus          Care Instructions      Preventive Health Recommendations:     See your health care provider every year to    Review health changes.     Discuss preventive care.      Review your medicines if your doctor has prescribed any.    Talk with your health care provider about whether you should have a test to screen for prostate cancer (PSA).    Every 3 years, have a diabetes test (fasting glucose). If you are at risk for diabetes, you should have this test more often.    Every 5 years, have a cholesterol test. Have this test more often if you are at risk for high cholesterol or heart disease.     Every 10 years, have a colonoscopy. Or, have a yearly FIT test (stool test). These exams will check for colon cancer.    Talk to with your health care provider about screening for Abdominal Aortic Aneurysm if you have a family history of AAA or have a history of smoking.  Shots:     Get a flu shot each year.     Get a tetanus shot every 10 years.     Talk to your doctor about your pneumonia vaccines. There are now two you should receive - Pneumovax (PPSV 23) and Prevnar (PCV 13).    Talk to your pharmacist about a shingles vaccine.     Talk to your doctor about the hepatitis B vaccine.  Nutrition:     Eat at least 5 servings of fruits and vegetables each day.     Eat whole-grain bread, whole-wheat pasta and brown rice instead of white grains and rice.      Get adequate Calcium and Vitamin D.   Lifestyle    Exercise for at least 150 minutes a week (30 minutes a day, 5 days a week). This will help you control your weight and prevent disease.     Limit alcohol to one drink per day.     No smoking.     Wear sunscreen to prevent skin cancer.     See your dentist every six months for an exam and cleaning.     See your eye doctor every 1 to 2 years to screen for conditions such as glaucoma, macular degeneration and cataracts.    Personalized Prevention Plan  You are due for the preventive services outlined below.  Your care team is available to assist you in scheduling these services.  If you have already completed any of these items, please share that information with your care team to update in your medical record.    Health Maintenance Due   Topic Date Due     Discuss Advance Directive Planning  11/25/2004     AORTIC ANEURYSM SCREENING (SYSTEM ASSIGNED)  11/25/2014     Flu Vaccine (1) 09/01/2018     FALL RISK ASSESSMENT  10/30/2018     Depression Assessment 2 - yearly  10/30/2018     Colon Cancer Screening - FIT Test - yearly  10/31/2018     ASSESSMENT/PLAN:                                                    Lifestyle recommendations:continue current healthy lifestyle efforts including regular exercise and keeping a good weight  The following exams/tests were recommended and discussed for health maintenance:  FIT testing to check for hidden blood in the stool is a colon cancer screening test which should be done once yearly if normal.  If abnormal, a colonoscopy is recommended.    Prostate cancer screening is optional starting at age 50 if normal risk, age 40 or 45 for high risk men (strong family history or blacks.)  Screening can include digital rectal exam and PSA blood test.  Prostate cancer screening is not recommended for older men, those age 70 and older or with anticipated lifespan <10 years.  The expert group USPSTF rcommmends against any PSA prostate cancer  screening.        ICD-10-CM    1. Medicare annual wellness visit, subsequent Z00.00    2. Need for prophylactic vaccination and inoculation against influenza Z23 FLU VACCINE, INCREASED ANTIGEN, PRESV FREE, AGE 65+ [51450]     Vaccine Administration, Initial [58277]   3. Special screening for malignant neoplasms, colon Z12.11 Fecal colorectal cancer screen (FIT)   4. CARDIOVASCULAR SCREENING; LDL GOAL LESS THAN 130 Z13.6 Lipid panel reflex to direct LDL Fasting   5. Screening for diabetes mellitus Z13.1 Glucose      Non-fasting blood tests today.   Complete FIT colon cancer screening test and send in the mail.            Follow-ups after your visit        Follow-up notes from your care team     Return in about 1 year (around 11/7/2019).      Future tests that were ordered for you today     Open Future Orders        Priority Expected Expires Ordered    Fecal colorectal cancer screen (FIT) Routine 11/28/2018 1/30/2019 11/7/2018            Who to contact     If you have questions or need follow up information about today's clinic visit or your schedule please contact [facility] directly at [phone].  Normal or non-critical lab and imaging results will be communicated to you by [employer], letter or phone within 4 business days after the clinic has received the results. If you do not hear from us within 7 days, please contact the clinic through [employer] or phone. If you have a critical or abnormal lab result, we will notify you by phone as soon as possible.  Submit refill requests through [employer] or call your pharmacy and they will forward the refill request to us. Please allow 3 business days for your refill to be completed.          Additional Information About Your Visit        [facility] Information     [employer] gives you secure access to your electronic health record. If you see a primary care provider, you can also send messages to your care team and make appointments. If you have questions, please  "call your primary care clinic.  If you do not have a primary care provider, please call [phone] and they will assist you.        Care EveryWhere ID     This is your Care EveryWhere ID. This could be used by other organizations to access your [facility] medical records  [id number]        Your Vitals Were     Pulse Temperature Respirations Height BMI (Body Mass Index)       84 97.2  F (36.2  C) (Oral) 14 5' 9.25\" (1.759 m) 27.74 kg/m2        Blood Pressure from Last 3 Encounters:   11/07/18 117/78   10/30/17 120/78   03/04/16 110/74    Weight from Last 3 Encounters:   11/07/18 189 lb 3.2 oz (85.8 kg)   10/30/17 179 lb 12.8 oz (81.6 kg)   03/04/16 191 lb 9.6 oz (86.9 kg)              We Performed the Following     FLU VACCINE, INCREASED ANTIGEN, PRESV FREE, AGE 65+ [14281]     Glucose     Lipid panel reflex to direct LDL Non-fasting     Vaccine Administration, Initial [84332]        Primary Care Provider Office Phone # Fax #    Fredy Condon [phone] [address] [place], [place] [id number]        Equal Access to Services     MARITZA SAMUEL : Hadii khadra starkey hadasho Soomaali, waaxda luqadaha, qaybta kaalmada adeegyada, heidi lewis. So [facility] [phone].    ATENCIÓN: Si habla español, tiene a arndt disposición servicios gratuitos de asistencia lingüística. Llame al [phone].    We comply with applicable federal civil rights laws and [place] laws. We do not discriminate on the basis of race, color, national origin, age, disability, sex, sexual orientation, or gender identity.            Thank you!     Thank you for choosing [facility]  for your care. Our goal is always to provide you with excellent care. Hearing back from our patients is one way we can continue to improve our services. Please take a few minutes to complete the written survey that you may receive in the mail after your visit with us. Thank you!             Your Updated " Medication List - Protect others around you: Learn how to safely use, store and throw away your medicines at www.disposemymeds.org.          This list is accurate as of 11/7/18 12:01 PM.  Always use your most recent med list.                   Brand Name Dispense Instructions for use Diagnosis    aspirin 81 MG tablet      Take 81 mg by mouth daily        MULTIVITAL Tabs     30 tablet    Take 1 tablet by mouth daily        NO ACTIVE MEDICATIONS

## 2018-11-07 NOTE — PATIENT INSTRUCTIONS
Preventive Health Recommendations:     See your health care provider every year to    Review health changes.     Discuss preventive care.      Review your medicines if your doctor has prescribed any.    Talk with your health care provider about whether you should have a test to screen for prostate cancer (PSA).    Every 3 years, have a diabetes test (fasting glucose). If you are at risk for diabetes, you should have this test more often.    Every 5 years, have a cholesterol test. Have this test more often if you are at risk for high cholesterol or heart disease.     Every 10 years, have a colonoscopy. Or, have a yearly FIT test (stool test). These exams will check for colon cancer.    Talk to with your health care provider about screening for Abdominal Aortic Aneurysm if you have a family history of AAA or have a history of smoking.  Shots:     Get a flu shot each year.     Get a tetanus shot every 10 years.     Talk to your doctor about your pneumonia vaccines. There are now two you should receive - Pneumovax (PPSV 23) and Prevnar (PCV 13).    Talk to your pharmacist about a shingles vaccine.     Talk to your doctor about the hepatitis B vaccine.  Nutrition:     Eat at least 5 servings of fruits and vegetables each day.     Eat whole-grain bread, whole-wheat pasta and brown rice instead of white grains and rice.     Get adequate Calcium and Vitamin D.   Lifestyle    Exercise for at least 150 minutes a week (30 minutes a day, 5 days a week). This will help you control your weight and prevent disease.     Limit alcohol to one drink per day.     No smoking.     Wear sunscreen to prevent skin cancer.     See your dentist every six months for an exam and cleaning.     See your eye doctor every 1 to 2 years to screen for conditions such as glaucoma, macular degeneration and cataracts.    Personalized Prevention Plan  You are due for the preventive services outlined below.  Your care team is available to assist you in  scheduling these services.  If you have already completed any of these items, please share that information with your care team to update in your medical record.    Health Maintenance Due   Topic Date Due     Discuss Advance Directive Planning  11/25/2004     AORTIC ANEURYSM SCREENING (SYSTEM ASSIGNED)  11/25/2014     Flu Vaccine (1) 09/01/2018     FALL RISK ASSESSMENT  10/30/2018     Depression Assessment 2 - yearly  10/30/2018     Colon Cancer Screening - FIT Test - yearly  10/31/2018     ASSESSMENT/PLAN:                                                    Lifestyle recommendations:continue current healthy lifestyle efforts including regular exercise and keeping a good weight  The following exams/tests were recommended and discussed for health maintenance:  FIT testing to check for hidden blood in the stool is a colon cancer screening test which should be done once yearly if normal.  If abnormal, a colonoscopy is recommended.    Prostate cancer screening is optional starting at age 50 if normal risk, age 40 or 45 for high risk men (strong family history or blacks.)  Screening can include digital rectal exam and PSA blood test.  Prostate cancer screening is not recommended for older men, those age 70 and older or with anticipated lifespan <10 years.  The expert group [employer] rcommmends against any PSA prostate cancer screening.        ICD-10-CM    1. Medicare annual wellness visit, subsequent Z00.00    2. Need for prophylactic vaccination and inoculation against influenza Z23 FLU VACCINE, INCREASED ANTIGEN, PRESV FREE, AGE 65+ [14854]     Vaccine Administration, Initial [70930]   3. Special screening for malignant neoplasms, colon Z12.11 Fecal colorectal cancer screen (FIT)   4. CARDIOVASCULAR SCREENING; LDL GOAL LESS THAN 130 Z13.6 Lipid panel reflex to direct LDL Fasting   5. Screening for diabetes mellitus Z13.1 Glucose      Non-fasting blood tests today.   Complete FIT colon cancer screening test and send in the  mail.

## 2018-11-07 NOTE — PROGRESS NOTES
Injectable Influenza Immunization Documentation    1.  Is the person to be vaccinated sick today?   No    2. Does the person to be vaccinated have an allergy to a component   of the vaccine?   No  Egg Allergy Algorithm Link    3. Has the person to be vaccinated ever had a serious reaction   to influenza vaccine in the past?   No    4. Has the person to be vaccinated ever had Guillain-Barré syndrome?   No    Form completed by Ceci Hirsch CMA    Prior to injection verified patient identity using patient's name and date of birth.  Due to injection administration, patient instructed to remain in clinic for 15 minutes  afterwards, and to report any adverse reaction to me immediately.    Ceci Hirsch CMA

## 2018-11-07 NOTE — NURSING NOTE
"Chief Complaint   Patient presents with     Wellness Visit       Initial /78 (BP Location: Right arm, Patient Position: Sitting, Cuff Size: Adult Large)  Pulse 84  Temp 97.2  F (36.2  C) (Oral)  Resp 14  Ht 5' 9.25\" (1.759 m)  Wt 189 lb 3.2 oz (85.8 kg)  BMI 27.74 kg/m2 Estimated body mass index is 27.74 kg/(m^2) as calculated from the following:    Height as of this encounter: 5' 9.25\" (1.759 m).    Weight as of this encounter: 189 lb 3.2 oz (85.8 kg).    Patient presents to the clinic using No DME    Health Maintenance that is potentially due pending provider review:  NONE    n/a    Is there anyone who you would like to be able to receive your results? Yes  Wife  If yes have patient fill out KACY Hirsch, CMA    Fit test given.  "

## 2018-11-08 NOTE — PROGRESS NOTES
Luis Toledo,  Your cholesterol, triglycerides and LDL cholesterol are increased similar to previous year.   Glucose slightly high as well.  New cholesterol guidelines (from AHA/ACC pooled risk calculation) estimates 10 year risk of having a heart attack at about 14.8%.  Any risk over 7.5% is considered significant and statin type medication is recommended to prevent heart attacks.  Your risk is similar to what we discussed.  You could lessen your chances a little for heart attacks and strokes taking cholesterol lowering medication.   If you would like to start cholesterol lowering medication, call and we could send in prescription.      PALMA WARNER MD     The 10-year ASCVD risk score (Kalyn RAMOS Jr, et al., 2013) is: 14.8%    Values used to calculate the score:      Age: 67 years      Sex: Male      Is Non- : No      Diabetic: No      Tobacco smoker: No      Systolic Blood Pressure: 117 mmHg      Is BP treated: No      HDL Cholesterol: 43 mg/dL      Total Cholesterol: 203 mg/dL

## 2018-11-15 PROCEDURE — 82274 ASSAY TEST FOR BLOOD FECAL: CPT | Performed by: FAMILY MEDICINE

## 2018-11-18 DIAGNOSIS — Z12.11 SPECIAL SCREENING FOR MALIGNANT NEOPLASMS, COLON: ICD-10-CM

## 2018-11-18 LAB — HEMOCCULT STL QL IA: NEGATIVE

## 2018-11-18 NOTE — PROGRESS NOTES
Luis Toledo,  The colon cancer screening test (FIT test) is negative for sign of colon cancer.    PLAN: Repeat once every year.    PALMA WARNER MD

## 2020-02-10 ENCOUNTER — HEALTH MAINTENANCE LETTER (OUTPATIENT)
Age: 70
End: 2020-02-10

## 2020-04-28 ENCOUNTER — OFFICE VISIT (OUTPATIENT)
Dept: URGENT CARE | Facility: URGENT CARE | Age: 70
End: 2020-04-28
Payer: COMMERCIAL

## 2020-04-28 VITALS
BODY MASS INDEX: 28.56 KG/M2 | DIASTOLIC BLOOD PRESSURE: 74 MMHG | TEMPERATURE: 96.9 F | WEIGHT: 192.8 LBS | HEIGHT: 69 IN | HEART RATE: 79 BPM | SYSTOLIC BLOOD PRESSURE: 126 MMHG | OXYGEN SATURATION: 97 % | RESPIRATION RATE: 16 BRPM

## 2020-04-28 DIAGNOSIS — K21.9 GASTROESOPHAGEAL REFLUX DISEASE, ESOPHAGITIS PRESENCE NOT SPECIFIED: Primary | ICD-10-CM

## 2020-04-28 PROCEDURE — 99214 OFFICE O/P EST MOD 30 MIN: CPT | Performed by: PHYSICIAN ASSISTANT

## 2020-04-28 ASSESSMENT — ENCOUNTER SYMPTOMS
EYE PAIN: 0
SINUS PAIN: 0
CONSTIPATION: 0
COUGH: 0
DIARRHEA: 0
PALPITATIONS: 0
ARTHRALGIAS: 0
EYE REDNESS: 0
SHORTNESS OF BREATH: 0
FATIGUE: 0
MYALGIAS: 0
EYE DISCHARGE: 0
SORE THROAT: 0
WHEEZING: 0
SINUS PRESSURE: 0
CHILLS: 0
VOMITING: 0
RHINORRHEA: 0
EYE ITCHING: 0
ABDOMINAL PAIN: 1
NAUSEA: 0
UNEXPECTED WEIGHT CHANGE: 0
FEVER: 0

## 2020-04-28 ASSESSMENT — MIFFLIN-ST. JEOR: SCORE: 1624.92

## 2020-04-28 NOTE — PROGRESS NOTES
SUBJECTIVE:   Tre Swartz is a 69 year old male presenting with a chief complaint of   Chief Complaint   Patient presents with     Gastrophageal Reflux     Today around 2pm it started to feel like bubbles in his chest, like gas, usually tums help but didn't this time, took 2 aspirin helped a little bit, Saturday was lightheaded.       He is an established patient of [facility].    Abdominal Pain    Location: epigastric   Radiation: None.    Pain character: dull and aching,   Severity: 6 on a scale of 1-10.    Duration: 1 day(s)   Course of Illness: resolved.  Exacerbated by: nothing   Relieved by: tums didn't help at the time, took baby aspirin.  Associated Symptoms: bloating.  Surgical History: none        Review of Systems   Constitutional: Negative for chills, fatigue, fever and unexpected weight change.   HENT: Negative for congestion, ear pain, rhinorrhea, sinus pressure, sinus pain and sore throat.    Eyes: Negative for pain, discharge, redness and itching.   Respiratory: Negative for cough, shortness of breath and wheezing.    Cardiovascular: Negative for chest pain, palpitations and leg swelling.   Gastrointestinal: Positive for abdominal pain. Negative for constipation, diarrhea, nausea and vomiting.   Musculoskeletal: Negative for arthralgias and myalgias.   Skin: Negative for rash.       Past Medical History:   Diagnosis Date     Esophageal reflux     GERD     Family History   Problem Relation Age of Onset     Diabetes Father      Hypertension Father      Heart Disease Mother         older age     Cancer Mother         liver, breast and lung     Cancer - colorectal No family hx of      Prostate Cancer No family hx of      Current Outpatient Medications   Medication Sig Dispense Refill     aspirin 81 MG tablet Take 81 mg by mouth daily       Multiple Vitamins-Minerals (MULTIVITAL) TABS Take 1 tablet by mouth daily 30 tablet 11     Omega-3 Fatty Acids (FISH OIL PO)        omeprazole (PRILOSEC) 20 MG   "capsule Take 1 capsule (20 mg) by mouth daily 30 capsule 0     NO ACTIVE MEDICATIONS        Social History     Tobacco Use     Smoking status: Never Smoker     Smokeless tobacco: Never Used   Substance Use Topics     Alcohol use: Yes     Comment: rarely       OBJECTIVE  /74 (BP Location: Right arm, Patient Position: Sitting, Cuff Size: Adult Large)   Pulse 79   Temp 96.9  F (36.1  C) (Tympanic)   Resp 16   Ht 1.753 m (5' 9\")   Wt 87.5 kg (192 lb 12.8 oz)   SpO2 97%   BMI 28.47 kg/m      Physical Exam  Constitutional:       General: He is not in acute distress.     Appearance: He is well-developed.   HENT:      Head: Normocephalic and atraumatic.      Right Ear: Tympanic membrane and ear canal normal.      Left Ear: Tympanic membrane and ear canal normal.   Eyes:      Conjunctiva/sclera: Conjunctivae normal.      Pupils: Pupils are equal, round, and reactive to light.   Cardiovascular:      Rate and Rhythm: Normal rate and regular rhythm.   Pulmonary:      Effort: Pulmonary effort is normal.      Breath sounds: Normal breath sounds.   Abdominal:      General: Bowel sounds are normal.      Palpations: Abdomen is soft.      Tenderness: There is no abdominal tenderness.   Skin:     General: Skin is warm and dry.      Findings: No rash.   Psychiatric:         Behavior: Behavior normal.         Labs:  No results found for this or any previous visit (from the past 24 hour(s)).    X-Ray was not done.    ASSESSMENT:      ICD-10-CM    1. Gastroesophageal reflux disease, esophagitis presence not specified  K21.9 omeprazole (PRILOSEC) 20 MG DR capsule        Medical Decision Making:    Differential Diagnosis:  Abdominal Pain: GERD/Ulcer, bloating, and gastritis    Serious Comorbid Conditions:  Adult:  None    PLAN:    ABD Pain:  Symptoms have resolved here in clinic. Continue to monitor. Prescribed omeprazole to use if needed. Return to clinic if symptoms worsen or do not improve; otherwise follow up as needed  "     Followup:    If not improving or if condition worsens, follow up with your Primary Care Provider    There are no Patient Instructions on file for this visit.

## 2020-05-28 ENCOUNTER — OFFICE VISIT (OUTPATIENT)
Dept: FAMILY MEDICINE | Facility: CLINIC | Age: 70
End: 2020-05-28
Payer: COMMERCIAL

## 2020-05-28 VITALS
BODY MASS INDEX: 27.99 KG/M2 | DIASTOLIC BLOOD PRESSURE: 70 MMHG | SYSTOLIC BLOOD PRESSURE: 118 MMHG | HEART RATE: 68 BPM | HEIGHT: 69 IN | WEIGHT: 189 LBS | RESPIRATION RATE: 18 BRPM

## 2020-05-28 DIAGNOSIS — K21.9 GASTROESOPHAGEAL REFLUX DISEASE, ESOPHAGITIS PRESENCE NOT SPECIFIED: ICD-10-CM

## 2020-05-28 PROCEDURE — 99213 OFFICE O/P EST LOW 20 MIN: CPT | Performed by: FAMILY MEDICINE

## 2020-05-28 ASSESSMENT — MIFFLIN-ST. JEOR: SCORE: 1607.68

## 2020-05-28 NOTE — PROGRESS NOTES
"SUBJECTIVE: .[email or web address]  Chief Complaint   Patient presents with     Gastrointestinal Problem      Gastrointestinal symptoms      Duration: worse over 3 weeks    Description:           ABDOMINAL PAIN - epigastric area    .  Intensity:  mild, moderate    Accompanying signs and symptoms:  Sore area  in mid abdomen seems to be when eating.     History  Previous {similar problem: Back in Feb had episode he became ill and vomited 6 times.  Previous evaluation:  04/28/20  Seen in UC    Aggravating factors: meals    Alleviating factors: Tums helps    Other Therapies tried: Tums  He feels a gas buildup.  Some epigastric pain.  Uncomfortable feeling.    Aggravating factors: eating rich foods, orange juice.    Alleviating factors: Tums.  Taking Tums about three per day on average.   Some days worse.   He was seen for this in UC on 4/28.  Was prescribed omeprazole for two weeks.   No nausea or vomiting.  No changes with bowels.  No diarrhea, constipation.   Sometimes waterbrash.   He had GERD many years ago.     No caffeine.  No carbonated beverages.   Takes ibuprofen once in a while.  Every couple weeks.  occasional Aleve-took when tooth problem.     No changes in health recently.     Patient Active Problem List   Diagnosis     Esophageal reflux     CARDIOVASCULAR SCREENING; LDL GOAL LESS THAN 130      ROS:General: No change in weight, sleep or appetite.  Normal energy.  No fever or chills  Resp: No coughing, wheezing or shortness of breath  CV: No chest pains or palpitations  : No urinary frequency or dysuria, bladder or kidney problems  Musculoskeletal: No significant muscle or joint pains  Psychiatric: No problems with anxiety, depression or mental health    OBJECTIVE:Blood pressure 118/70, pulse 68, resp. rate 18, height 1.753 m (5' 9\"), weight 85.7 kg (189 lb). BMI=Body mass index is 27.91 kg/m .  GENERAL APPEARANCE ADULT: Alert, no acute distress  HENT: oral mucous membranes moist, oropharynx clear  NECK: No " adenopathy,masses or thyromegaly  CV: normal rate, regular rhythm, no murmur or gallop  ABDOMEN: soft, no organomegaly, masses or tenderness     ASSESSMENT:   (K21.9) Gastroesophageal reflux disease, esophagitis presence not specified  Comment: I think this pain is from inflammation in stomach and acid.   Plan: omeprazole (PRILOSEC) 20 MG DR capsule          Gastroesophageal reflux (GERD) lifestyle changes that can help improve symptoms include:  Eating smaller meals and not lying down after meals  Elevate head of bed with 6-8 inch blocks or a wedge pillow.   Avoid lying flat on back after eating and at bedtime  Avoid tight fitting clothing  Avoid reflux inducing foods like fat, caffeine, chocolate, carbonated beverages  Maintain an ideal body weight  Avoid alcohol and tobacco  Avoid medications like ibuprofen, naproxen and aspirin    Take omeprazole 20mg daily for 4-6 weeks.  Take in the AM 30-60 minutes bedore eating.   Gastroscopy recommended if not better within 2 weeks.  Call me if not improving during that time period.     If doing better within 2 weeks, continue the omeprazole for 6-8 weeks, then try stopping.   Also, if symptoms persist, recheck and consider gastroscopy.

## 2020-05-28 NOTE — NURSING NOTE
"Chief Complaint   Patient presents with     Gastrointestinal Problem       Initial /70   Pulse 68   Resp 18   Ht 1.753 m (5' 9\")   Wt 85.7 kg (189 lb)   BMI 27.91 kg/m   Estimated body mass index is 27.91 kg/m  as calculated from the following:    Height as of this encounter: 1.753 m (5' 9\").    Weight as of this encounter: 85.7 kg (189 lb).    Patient presents to the clinic using     Health Maintenance that is potentially due pending provider review:          Is there anyone who you would like to be able to receive your results?   If yes have patient fill out KACY    "

## 2020-08-20 ENCOUNTER — OFFICE VISIT (OUTPATIENT)
Dept: FAMILY MEDICINE | Facility: CLINIC | Age: 70
End: 2020-08-20
Payer: COMMERCIAL

## 2020-08-20 VITALS
WEIGHT: 182 LBS | HEART RATE: 66 BPM | RESPIRATION RATE: 18 BRPM | SYSTOLIC BLOOD PRESSURE: 120 MMHG | BODY MASS INDEX: 26.96 KG/M2 | DIASTOLIC BLOOD PRESSURE: 64 MMHG | HEIGHT: 69 IN | TEMPERATURE: 97.4 F

## 2020-08-20 DIAGNOSIS — K21.9 GASTROESOPHAGEAL REFLUX DISEASE WITHOUT ESOPHAGITIS: ICD-10-CM

## 2020-08-20 DIAGNOSIS — Z13.1 SCREENING FOR DIABETES MELLITUS: ICD-10-CM

## 2020-08-20 DIAGNOSIS — Z13.6 CARDIOVASCULAR SCREENING; LDL GOAL LESS THAN 130: ICD-10-CM

## 2020-08-20 DIAGNOSIS — Z00.00 MEDICARE ANNUAL WELLNESS VISIT, SUBSEQUENT: Primary | ICD-10-CM

## 2020-08-20 DIAGNOSIS — Z12.11 SCREENING FOR COLON CANCER: ICD-10-CM

## 2020-08-20 LAB
ANION GAP SERPL CALCULATED.3IONS-SCNC: 1 MMOL/L (ref 3–14)
BUN SERPL-MCNC: 12 MG/DL (ref 7–30)
CALCIUM SERPL-MCNC: 8.7 MG/DL (ref 8.5–10.1)
CHLORIDE SERPL-SCNC: 104 MMOL/L (ref 94–109)
CHOLEST SERPL-MCNC: 178 MG/DL
CO2 SERPL-SCNC: 31 MMOL/L (ref 20–32)
CREAT SERPL-MCNC: 0.83 MG/DL (ref 0.66–1.25)
GFR SERPL CREATININE-BSD FRML MDRD: 89 ML/MIN/{1.73_M2}
GLUCOSE SERPL-MCNC: 99 MG/DL (ref 70–99)
HDLC SERPL-MCNC: 40 MG/DL
LDLC SERPL CALC-MCNC: 119 MG/DL
NONHDLC SERPL-MCNC: 141 MG/DL
POTASSIUM SERPL-SCNC: 4.2 MMOL/L (ref 3.4–5.3)
SODIUM SERPL-SCNC: 136 MMOL/L (ref 133–144)
TRIGL SERPL-MCNC: 113 MG/DL

## 2020-08-20 PROCEDURE — 99397 PER PM REEVAL EST PAT 65+ YR: CPT | Performed by: FAMILY MEDICINE

## 2020-08-20 PROCEDURE — 80048 BASIC METABOLIC PNL TOTAL CA: CPT | Performed by: FAMILY MEDICINE

## 2020-08-20 PROCEDURE — 36415 COLL VENOUS BLD VENIPUNCTURE: CPT | Performed by: FAMILY MEDICINE

## 2020-08-20 PROCEDURE — 80061 LIPID PANEL: CPT | Performed by: FAMILY MEDICINE

## 2020-08-20 ASSESSMENT — ENCOUNTER SYMPTOMS
ABDOMINAL PAIN: 0
CHILLS: 0
COUGH: 0
DIZZINESS: 0
DIARRHEA: 0
HEMATOCHEZIA: 0
CONSTIPATION: 0
HEMATURIA: 0

## 2020-08-20 ASSESSMENT — MIFFLIN-ST. JEOR: SCORE: 1575.93

## 2020-08-20 ASSESSMENT — ACTIVITIES OF DAILY LIVING (ADL): CURRENT_FUNCTION: NO ASSISTANCE NEEDED

## 2020-08-20 NOTE — NURSING NOTE
"Chief Complaint   Patient presents with     Physical       Initial /64   Pulse 66   Temp 97.4  F (36.3  C) (Tympanic)   Resp 18   Ht 1.753 m (5' 9\")   Wt 82.6 kg (182 lb)   BMI 26.88 kg/m   Estimated body mass index is 26.88 kg/m  as calculated from the following:    Height as of this encounter: 1.753 m (5' 9\").    Weight as of this encounter: 82.6 kg (182 lb).    Patient presents to the clinic using     Health Maintenance that is potentially due pending provider review:          Is there anyone who you would like to be able to receive your results?   If yes have patient fill out KACY    "

## 2020-08-20 NOTE — PATIENT INSTRUCTIONS
Lifestyle recommendations:continue current healthy lifestyle efforts including regular exercise and keeping a good weight  The following exams/tests were recommended and discussed for health maintenance:  FIT testing to check for hidden blood in the stool is a colon cancer screening test which should be done once yearly if normal.  If abnormal, a colonoscopy is recommended.    Prostate cancer screening is optional starting at age 50 if normal risk, age 40 or 45 for high risk men (strong family history or blacks.)  Screening can include digital rectal exam and PSA blood test.  Prostate cancer screening is not recommended for older men, those age 70 and older or with anticipated lifespan <10 years.  The expert group [employer] rcommmends against any PSA prostate cancer screening.      (Z00.00) Medicare annual wellness visit, subsequent  (primary encounter diagnosis)    (Z12.11) Screening for colon cancer  Comment:   Plan: Fecal colorectal cancer screen (FIT)        Complete FIT colon cancer screening test and send in the mail.      (K21.9) Gastroesophageal reflux disease without esophagitis  Comment: improved  Plan: Continue the omeprazole daily for the next couple weeks, then every other day for a couple weeks, then try stopping.  It is OK to take Tums as needed for heartburn.     (Z13.6) CARDIOVASCULAR SCREENING; LDL GOAL LESS THAN 130  Comment:   Plan: Lipid panel reflex to direct LDL Fasting        Fasting blood tests today.     (Z13.1) Screening for diabetes mellitus  Comment:   Plan: Basic metabolic panel

## 2020-08-20 NOTE — PROGRESS NOTES
"SUBJECTIVE:   Tre Swartz is a 69 year old male who presents for Preventive Visit.  Chief Complaint   Patient presents with     Physical      Last clinic visit: 5/28 for GERD.   He reports symptoms come and go.  Improved over time.   omeprazole helps.   May be related to certain foods.   Tomatoes seem to bother.     No other health concerns today.     Are you in the first 12 months of your Medicare coverage?  No    Healthy Habits:     In general, how would you rate your overall health?  Good    Frequency of exercise:  4-5 days/week    Duration of exercise:  45-60 minutes    Do you usually eat at least 4 servings of fruit and vegetables a day, include whole grains    & fiber and avoid regularly eating high fat or \"junk\" foods?  Yes    Taking medications regularly:  Yes    Medication side effects:  None    Ability to successfully perform activities of daily living:  No assistance needed    Home Safety:  Lack of grab bars in the bathroom    Hearing Impairment:  Need to ask people to speak up or repeat themselves    In the past 6 months, have you been bothered by leaking of urine?  No    In general, how would you rate your overall mental or emotional health?  Good      PHQ-2 Total Score: 0    Additional concerns today:  No  Exercise:[employer].  Walks.     Do you feel safe in your environment? No    Have you ever done Advance Care Planning? (For example, a Health Directive, POLST, or a discussion with a medical provider or your loved ones about your wishes): Yes, advance care planning is on file.    Fall risk  Fallen 2 or more times in the past year?: No  Any fall with injury in the past year?: No    Cognitive Screening   1) Repeat 3 items (Leader, Season, Table)    2) Clock draw: NORMAL  3) 3 item recall: Recalls 3 objects  Results: 3 items recalled: COGNITIVE IMPAIRMENT LESS LIKELY    Mini-CogTM Copyright S Vinny. Licensed by the author for use in [facility]; reprinted with permission " (ada@[facility]). All rights reserved.      Do you have sleep apnea, excessive snoring or daytime drowsiness?: no    Social History     Tobacco Use     Smoking status: Never Smoker     Smokeless tobacco: Never Used   Substance Use Topics     Alcohol use: Yes     Comment: rarely     If you drink alcohol do you typically have >3 drinks per day or >7 drinks per week? No    Alcohol Use 2020   Prescreen: >3 drinks/day or >7 drinks/week? Not Applicable   Prescreen: >3 drinks/day or >7 drinks/week? -     Current providers sharing in care for this patient include:   Patient Care Team:  Fredy Condon MD as PCP - General (Family Practice)  Fredy Condon MD as Assigned PCP   No specialists.     The following health maintenance items are reviewed in Epic and correct as of today:  Health Maintenance   Topic Date Due     ADVANCE CARE PLANNING  1950     AORTIC ANEURYSM SCREENING (SYSTEM ASSIGNED)  2014     MEDICARE ANNUAL WELLNESS VISIT  2019     COLORECTAL CANCER SCREENING  11/15/2019     INFLUENZA VACCINE (1) 2020     FALL RISK ASSESSMENT  2021     LIPID  2023     DTAP/TDAP/TD IMMUNIZATION (3 - Td) 10/03/2027     HEPATITIS C SCREENING  Completed     PHQ-2  Completed     PNEUMOCOCCAL IMMUNIZATION 65+ LOW/MEDIUM RISK  Completed     ZOSTER IMMUNIZATION  Completed     IPV IMMUNIZATION  Aged Out     MENINGITIS IMMUNIZATION  Aged Out     HEPATITIS B IMMUNIZATION  Aged Out     Patient Active Problem List    Diagnosis Date Noted     CARDIOVASCULAR SCREENING; LDL GOAL LESS THAN 130 10/31/2010     Priority: Medium     Esophageal reflux 2007     Priority: Medium        Family history:  CV disease: mother  Prostate cancer: no  Colon cancer: no    Multivitamin or Vit D use: MVI and fish oil.     Vaccines:current     Past Colon cancer screenin2018    Review of Systems   Constitutional: Negative for chills.   HENT: Negative for congestion.    Respiratory: Negative for cough.   "  Cardiovascular: Negative for chest pain.   Gastrointestinal: Negative for abdominal pain, constipation, diarrhea and hematochezia.   Genitourinary: Negative for hematuria.   Neurological: Negative for dizziness.     OBJECTIVE:                                                    OBJECTIVE:Blood pressure 120/64, pulse 66, temperature 97.4  F (36.3  C), temperature source Tympanic, resp. rate 18, height 1.753 m (5' 9\"), weight 82.6 kg (182 lb). BMI=Body mass index is 26.88 kg/m .  GENERAL APPEARANCE ADULT: Alert, no acute distress  EYES: PERRL, EOM normal, conjunctiva and lids normal  HENT: right TM not visualized secondary to cerumen, left TM normal, oral mucous membranes moist, oropharynx clear  NECK: No adenopathy,masses or thyromegaly  RESP: lungs clear to auscultation   CV: normal rate, regular rhythm, no murmur or gallop  ABDOMEN: soft, no organomegaly, masses or tenderness  MS: extremities normal, no peripheral edema    ASSESSMENT/PLAN:                                                    Lifestyle recommendations:continue current healthy lifestyle efforts including regular exercise and keeping a good weight  The following exams/tests were recommended and discussed for health maintenance:  FIT testing to check for hidden blood in the stool is a colon cancer screening test which should be done once yearly if normal.  If abnormal, a colonoscopy is recommended.    Prostate cancer screening is optional starting at age 50 if normal risk, age 40 or 45 for high risk men (strong family history or blacks.)  Screening can include digital rectal exam and PSA blood test.  Prostate cancer screening is not recommended for older men, those age 70 and older or with anticipated lifespan <10 years.  The expert group USPSTF rcommmends against any PSA prostate cancer screening.      (Z00.00) Medicare annual wellness visit, subsequent  (primary encounter diagnosis)    (Z12.11) Screening for colon cancer  Comment:   Plan: Fecal " "colorectal cancer screen (FIT)        Complete FIT colon cancer screening test and send in the mail.      (K21.9) Gastroesophageal reflux disease without esophagitis  Comment: improved  Plan: Continue the omeprazole daily for the next couple weeks, then every other day for a couple weeks, then try stopping.  It is OK to take Tums as needed for heartburn.     (Z13.6) CARDIOVASCULAR SCREENING; LDL GOAL LESS THAN 130  Comment:   Plan: Lipid panel reflex to direct LDL Fasting        Fasting blood tests today.     (Z13.1) Screening for diabetes mellitus  Comment:   Plan: Basic metabolic panel             COUNSELING:  Reviewed preventive health counseling, as reflected in patient instructions  Special attention given to:       Prostate cancer screening    Estimated body mass index is 27.91 kg/m  as calculated from the following:    Height as of 5/28/20: 1.753 m (5' 9\").    Weight as of 5/28/20: 85.7 kg (189 lb).         reports that he has never smoked. He has never used smokeless tobacco.      Appropriate preventive services were discussed with this patient, including applicable screening as appropriate for cardiovascular disease, diabetes, osteopenia/osteoporosis, and glaucoma.  As appropriate for age/gender, discussed screening for colorectal cancer, prostate cancer, breast cancer, and cervical cancer. Checklist reviewing preventive services available has been given to the patient.    Reviewed patients plan of care and provided an AVS. The Basic Care Plan (routine screening as documented in Health Maintenance) for Tre meets the Care Plan requirement. This Care Plan has been established and reviewed with the Patient.    Counseling Resources:  ATP IV Guidelines  Pooled Cohorts Equation Calculator  Breast Cancer Risk Calculator  FRAX Risk Assessment  ICSI Preventive Guidelines  Dietary Guidelines for Americans, 2010  USDA's MyPlate  ASA Prophylaxis  Lung CA Screening    Fredy Condon MD  [facility]3] " [facility]    Identified Health Risks:

## 2020-08-21 NOTE — RESULT ENCOUNTER NOTE
Please call.      I suggest starting atorvastatin 20mg daily  If willing to add this medication, we can do prescription for #30 with 11 refills.    Recheck ALT and lipids 2-3 months after starting medication.   Please arrange for prescriptions and orders.      Hi Tre,   The blood chemistries (Basic metabolic panel) are all normal including electrolytes (salt balances in the blood), blood glucose and kidney tests.   LDL cholesterol is a little high.    New cholesterol guidelines (from AHA/ACC pooled risk calculation) estimates 10 year risk of having a heart attack at about 17%.  Any risk over 7.5% is considered significant and statin type medication is recommended to prevent heart attacks.    There is a significant risk for heart attacks and strokes.   I recommend adding a cholesterol lowering medication to lower the chances of heart attacks and strokes.      I will have my staff contact you about starting medication to lower cholesterol and LDL.    PALMA WARNER MD       The 10-year ASCVD risk score (Kalyn RAMOS Jr., et al., 2013) is: 17%    Values used to calculate the score:      Age: 69 years      Sex: Male      Is Non- : No      Diabetic: No      Tobacco smoker: No      Systolic Blood Pressure: 120 mmHg      Is BP treated: No      HDL Cholesterol: 40 mg/dL      Total Cholesterol: 178 mg/dL

## 2020-09-08 ENCOUNTER — ALLIED HEALTH/NURSE VISIT (OUTPATIENT)
Dept: FAMILY MEDICINE | Facility: CLINIC | Age: 70
End: 2020-09-08
Payer: COMMERCIAL

## 2020-09-08 DIAGNOSIS — H61.21 IMPACTED CERUMEN OF RIGHT EAR: Primary | ICD-10-CM

## 2020-09-08 PROCEDURE — 99207 ZZC NO CHARGE NURSE ONLY: CPT

## 2020-09-08 NOTE — NURSING NOTE
Tre came into clinic wanting ear flushed. No pain but when seen last month told he had wax in ear. He has put in drops and is requesting ear flush. Jo Patel checked his ears and gave okay to flush. Rt ear flushed with no problems, he has no complaints after flush was done.    Paula Linton,CMA

## 2020-10-30 NOTE — PATIENT INSTRUCTIONS
ASSESSMENT:   (K21.9) Gastroesophageal reflux disease, esophagitis presence not specified  Comment: I think this pain is from inflammation in stomach and acid.   Plan: omeprazole (PRILOSEC) 20 MG DR capsule          Gastroesophageal reflux (GERD) lifestyle changes that can help improve symptoms include:  Eating smaller meals and not lying down after meals  Elevate head of bed with 6-8 inch blocks or a wedge pillow.   Avoid lying flat on back after eating and at bedtime  Avoid tight fitting clothing  Avoid reflux inducing foods like fat, caffeine, chocolate, carbonated beverages  Maintain an ideal body weight  Avoid alcohol and tobacco  Avoid medications like ibuprofen, naproxen and aspirin    Take omeprazole 20mg daily for 4-6 weeks.  Take in the AM 30-60 minutes bedore eating.   Gastroscopy recommended if not better within 2 weeks.  Call me if not improving during that time period.     If doing better within 2 weeks, continue the omeprazole for 6-8 weeks, then try stopping.   Also, if symptoms persist, recheck and consider gastroscopy.    O-T Plasty Text: The defect edges were debeveled with a #15 scalpel blade.  Given the location of the defect, shape of the defect and the proximity to free margins an O-T plasty was deemed most appropriate.  Using a sterile surgical marker, an appropriate O-T plasty was drawn incorporating the defect and placing the expected incisions within the relaxed skin tension lines where possible.    The area thus outlined was incised deep to adipose tissue with a #15 scalpel blade.  The skin margins were undermined to an appropriate distance in all directions utilizing iris scissors.

## 2020-11-16 ENCOUNTER — HEALTH MAINTENANCE LETTER (OUTPATIENT)
Age: 70
End: 2020-11-16

## 2021-09-18 ENCOUNTER — HEALTH MAINTENANCE LETTER (OUTPATIENT)
Age: 71
End: 2021-09-18

## 2021-11-13 ENCOUNTER — HEALTH MAINTENANCE LETTER (OUTPATIENT)
Age: 71
End: 2021-11-13

## 2022-11-19 ENCOUNTER — HEALTH MAINTENANCE LETTER (OUTPATIENT)
Age: 72
End: 2022-11-19

## 2022-12-06 ENCOUNTER — OFFICE VISIT (OUTPATIENT)
Dept: FAMILY MEDICINE | Facility: CLINIC | Age: 72
End: 2022-12-06
Payer: COMMERCIAL

## 2022-12-06 VITALS
DIASTOLIC BLOOD PRESSURE: 70 MMHG | BODY MASS INDEX: 28.5 KG/M2 | HEART RATE: 73 BPM | TEMPERATURE: 97 F | RESPIRATION RATE: 12 BRPM | OXYGEN SATURATION: 98 % | SYSTOLIC BLOOD PRESSURE: 116 MMHG | WEIGHT: 193 LBS

## 2022-12-06 DIAGNOSIS — Z12.11 SCREEN FOR COLON CANCER: ICD-10-CM

## 2022-12-06 DIAGNOSIS — I87.8 VENOUS STASIS: ICD-10-CM
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
## 2022-12-06 DIAGNOSIS — Z00.00 MEDICARE ANNUAL WELLNESS VISIT, SUBSEQUENT: ICD-10-CM

## 2022-12-06 PROCEDURE — G0439 PPPS, SUBSEQ VISIT: HCPCS | Performed by: FAMILY MEDICINE

## 2022-12-06 PROCEDURE — 99213 OFFICE O/P EST LOW 20 MIN: CPT | Mod: 25 | Performed by: FAMILY MEDICINE

## 2022-12-06 RX ORDER — B-COMPLEX WITH VITAMIN C
TABLET ORAL
COMMUNITY
Start: 2021-12-02

## 2022-12-06 RX ORDER — MULTIVIT WITH MINERALS/LUTEIN
TABLET ORAL
COMMUNITY
Start: 2021-03-04

## 2022-12-06 RX ORDER — MAGNESIUM OXIDE 400 MG/1
TABLET ORAL
COMMUNITY
Start: 2021-03-04

## 2022-12-06 ASSESSMENT — PAIN SCALES - GENERAL: PAINLEVEL: MODERATE PAIN (5)

## 2022-12-06 NOTE — NURSING NOTE
"Chief Complaint   Patient presents with     Fall     /70   Pulse 73   Temp 97  F (36.1  C) (Tympanic)   Resp 12   Wt 87.5 kg (193 lb)   SpO2 98%   BMI 28.50 kg/m   Estimated body mass index is 28.5 kg/m  as calculated from the following:    Height as of 8/20/20: 1.753 m (5' 9\").    Weight as of this encounter: 87.5 kg (193 lb).  Patient presents to the clinic using No DME      Health Maintenance that is potentially due pending provider review:    Health Maintenance Due   Topic Date Due     ANNUAL REVIEW OF HM ORDERS  Never done     COVID-19 Vaccine (1) Never done     AORTIC ANEURYSM SCREENING (SYSTEM ASSIGNED)  Never done     COLORECTAL CANCER SCREENING  11/15/2019     MEDICARE ANNUAL WELLNESS VISIT  08/20/2021     INFLUENZA VACCINE (1) 09/01/2022        n/a        "

## 2022-12-06 NOTE — PROGRESS NOTES
Assessment & Plan     Medicare annual wellness visit, subsequent  No risk     Fall, initial encounter  Mild buttock contusion   improving     Venous stasis  Compression stocking     Screen for colon cancer  Has FIT at home will bring in                    Return in about 1 year (around 12/6/2023) for Routine Preventative Visit.    Maggie Olivares MD  [facility]    Lisandra Toledo is a 72 year old, presenting for the following health issues:  Fall    Fall - 1 week ago 11/29/22    History of Present Illness       Reason for visit:  Fell on ice/cement-not sure if I broke tailbone? Lump above knee from fall.   Also, leaking blood vessels in legs increasing, getting worse going further up legs, and warm.  Symptom onset:  3-7 days ago  Symptoms include:  From fall: Pain in tailbone, swelling above knee. Leaking blood vessels:  more red dots, higher in leg, warm to touch.  Symptom intensity:  Moderate  Symptom progression:  Staying the same  Had these symptoms before:  Yes  What makes it worse:  Sitting a certain way for injury.  Standing makes blood vessel issue worse    He eats 0-1 servings of fruits and vegetables daily.He consumes 1 sweetened beverage(s) daily.He exercises with enough effort to increase his heart rate 30 to 60 minutes per day.  He exercises with enough effort to increase his heart rate 5 days per week.   He is taking medications regularly.     Discoloration of the legs   Occasional swelling and at times redness of the skin       Annual Wellness Visit    Patient has been advised of split billing requirements and indicates understanding: Yes     Are you in the first 12 months of your Medicare Part B coverage?  No    Physical Health:    In general, how would you rate your overall physical health? excellent    Outside of work, how many days during the week do you exercise?6-7 days/week    Outside of work, approximately how many minutes a day do you exercise?45-60  "minutes    If you drink alcohol do you typically have >3 drinks per day or >7 drinks per week? No    Do you usually eat at least 4 servings of fruit and vegetables a day, include whole grains & fiber and avoid regularly eating high fat or \"junk\" foods? Yes    Do you have any problems taking medications regularly? No    Do you have any side effects from medications? not applicable    Needs assistance for the following daily activities: no assistance needed    Which of the following safety concerns are present in your home?  none identified     Hearing impairment: Yes, hearing not good     [facility]    In the past 6 months, have you been bothered by leaking of urine? no    Mental Health:    In general, how would you rate your overall mental or emotional health? excellent  PHQ-2 Score: 0    Do you feel safe in your environment? Yes    Have you ever done Advance Care Planning? (For example, a Health Directive, POLST, or a discussion with a medical provider or your loved ones about your wishes)? Yes, advance care planning is on file.    Fall risk:  Fallen 2 or more times in the past year?: No  Any fall with injury in the past year?: Yes    Cognitive Screening:   Reports normal memory   Declines cog screen     Mini-CogTM Copyright S Vinny. Licensed by the author for use in [facility]; reprinted with permission ([email or web address] [email or web address]). All rights reserved.      Do you have sleep apnea, excessive snoring or daytime drowsiness?: no    Current providers sharing in care for this patient include:   Patient Care Team:  Fredy Condon MD as PCP - General (Family Practice)  Fredy Condon MD as Assigned PCP    Patient has been advised of split billing requirements and indicates understanding: Yes      Review of Systems   Constitutional, HEENT, cardiovascular, pulmonary, gi and gu systems are negative, except as otherwise noted.      Objective    /70   Pulse 73   Temp 97  F (36.1 "  C) (Tympanic)   Resp 12   Wt 87.5 kg (193 lb)   SpO2 98%   BMI 28.50 kg/m    Body mass index is 28.5 kg/m .  Physical Exam   GENERAL APPEARANCE: healthy, alert and no distress  RESP: lungs clear to auscultation - no rales, rhonchi or wheezes  CV: regular rates and rhythm, normal S1 S2, no S3 or S4 and no murmur, click or rub  MS: extremities normal- no gross deformities noted  SKIN: venous stasis changes of the LE bilaterally   PSYCH: mentation appears normal and affect normal/bright     ischial area

## 2022-12-08 ENCOUNTER — OFFICE VISIT (OUTPATIENT)
Dept: URGENT CARE | Facility: URGENT CARE | Age: 72
End: 2022-12-08
Payer: COMMERCIAL

## 2022-12-08 VITALS
DIASTOLIC BLOOD PRESSURE: 73 MMHG | BODY MASS INDEX: 28.5 KG/M2 | WEIGHT: 193 LBS | TEMPERATURE: 97.8 F | HEART RATE: 80 BPM | SYSTOLIC BLOOD PRESSURE: 140 MMHG | OXYGEN SATURATION: 98 %

## 2022-12-08 DIAGNOSIS — B35.4 TINEA CORPORIS: Primary | ICD-10-CM

## 2022-12-08 PROCEDURE — 99213 OFFICE O/P EST LOW 20 MIN: CPT

## 2022-12-08 RX ORDER — CLOTRIMAZOLE 1 %
CREAM (GRAM) TOPICAL 2 TIMES DAILY
Qty: 12 G | Refills: 0 | Status: SHIPPED | OUTPATIENT
Start: 2022-12-08 | End: 2024-09-26

## 2022-12-08 NOTE — PATIENT INSTRUCTIONS
Diagnosis: Fungal skin infection   Plan:   Wash and DRY the skin at least twice a day and before using the prescribed cream:   cream}   Wet the area.  Wash with a mild soap.  towels should only be used by the affected person and Machine wash the towel in hot, soapy water after every use.  Change clothing daily  Wear loose clothing when possible.  Change out of tight clothing like compression shorts, athletic supporters, bathing suits, tights, and pantyhose as soon as possible.    Monitor for:   rash does not start to get better in a week or so.  the rash spreads somewhere else on the body.  The infected skin looks redder or starts to ooze.         Fungal infections of the skin:   These rashes are:  Red and itchy    It is caused by a type of germ called a fungus.   It usually goes away after a few weeks of treatment with a medicated cream.  Causes:   This rash is spread by touching something, like a towel or sheet with a fungus on it, usually on open skin   Or from another human's fungal skin infection to an open area such as a scalp on anther person   Sweating a lot or wearing tight clothing makes it easier for the fungus to grow.  Prevention:   Keep skin clean and dry.  Wash well with soap and shampoo after any sport that involves skin-to-skin contact.  Wear loose clothing and change socks and underwear at least once a day.  Avoid sharing clothing, sports equipment, towels, brushes, and other personal items.  Wear flip flops when walking in a shower stall, a locker room, or pool area.  Some people are more prone to having a skin reaction when they contact the fungus than others and this maybe a lifelong reoccurrence for you

## 2022-12-08 NOTE — PROGRESS NOTES
URGENT CARE  Assessment & Plan   Assessment:   Tre Swartz is a 72 year old male who's clinical presentation today is consistent with:   1. Tinea corporis  - clotrimazole (LOTRIMIN) 1 % external cream; Apply topically 2 times daily  Dispense: 12 g; Refill: 0  - terbinafine (LAMISIL) 1 % external solution; Apply 1 mL topically 2 times daily  Dispense: 125 mL; Refill: 0    No alarm signs or symptoms present   Differential Diagnoses for this patient's CC include    Atopic dermatitis, allergic Dermatitis, Insect bite/sting, drug reaction, impetigo,    acne vulgaris,    Psoriasis, scabies, seborrheic dermatitis/keratosis,    actinic keratosis, other skin  malignancy: SSC, BCC, melanoma   Lupus, solar lentigo, verruca, lichen planus, rosacea,    Plan:  Will treat patient's tinea at this time with antifungal cream.   Encourage patient to continue to monitor symptoms of increased  worsening/spreading infection and to follow up in 3-5 days if there is no improvement, sooner if they experience increasing redness, swelling, red streaks, new fevers, new regional lymphadenopathy or new pain.    Additionally educated patient on the contagiousness nature of this infection     Patient  is  agreeable to treatment plan and state they will follow-up if symptoms do not improve and/or if symptoms worsen (see patient's AVS 'monitor for' section for specific patient instructions given and discussed regarding what to watch for and when to follow up)    Medications ordered are listed above, please see AVS for patient's specific and personalized discharge instructions given     STANISLAV Waterman [facility] [place]      ______________________________________________________________________        Subjective  Subjective     HPI: Tre Swartz  is a 72 year old  male who presents today for evaluation the following concerns:   Patient reports a rash noted to the left wrist and left side of his neck, which  started 2 days ago, on 12/6/22  Patient endorses the rash is  pruritic.   Patient states the rash is: red, circular, raised,and has a central clearing.   Patient denies having a rash like this before  Patient denies that there is pain or any drainage     No Known Allergies  Patient Active Problem List   Diagnosis     Esophageal reflux     CARDIOVASCULAR SCREENING; LDL GOAL LESS THAN 130     Venous stasis       Review of Systems:  Pertinent review of systems as reflected in HPI, otherwise negative.     Objective  Objective    Physical Exam:  Vitals:    12/08/22 1509   BP: (!) 140/73   Pulse: 80   Temp: 97.8  F (36.6  C)   SpO2: 98%   Weight: 87.5 kg (193 lb)      General:   alert and oriented, no acute distress, nonill-appearing   Vital signs reviewed: afebrile and normotensive     Psy/mental status:   SKIN:   Right wrist/ and right side of neck:   Each areas has a singular circumscribed ring shaped lesion    with clear interior areas present   The lesions are targeted, raised, reddish, well demarcated, scaley.      I explained my diagnostic considerations and recommendations to the patient, who voiced understanding and agreement with the treatment plan.   All questions were answered.   We discussed potential side effects, risks and benefits of any prescribed or recommended therapies, as well as expectations for response to treatments.  Please see AVS for any patient instructions & handouts given.   Patient was advised to contact the Nurse Care Line, their Primary Care provider, Urgent Care, or the Emergency Department if there are new or worsening symptoms, or call 911 for emergencies.        ______________________________________________________________________          Patient Instructions   Diagnosis: Fungal skin infection   Plan:     Wash and DRY the skin at least twice a day and before using the prescribed cream:     cream}     Wet the area.    Wash with a mild soap.    towels should only be used by the  affected person and Machine wash the towel in hot, soapy water after every use.    Change clothing daily    Wear loose clothing when possible.    Change out of tight clothing like compression shorts, athletic supporters, bathing suits, tights, and pantyhose as soon as possible.    Monitor for:     rash does not start to get better in a week or so.    the rash spreads somewhere else on the body.    The infected skin looks redder or starts to ooze.         Fungal infections of the skin:   These rashes are:  Red and itchy    It is caused by a type of germ called a fungus.   It usually goes away after a few weeks of treatment with a medicated cream.  Causes:   This rash is spread by touching something, like a towel or sheet with a fungus on it, usually on open skin   Or from another human's fungal skin infection to an open area such as a scalp on anther person   Sweating a lot or wearing tight clothing makes it easier for the fungus to grow.  Prevention:     Keep skin clean and dry.    Wash well with soap and shampoo after any sport that involves skin-to-skin contact.    Wear loose clothing and change socks and underwear at least once a day.    Avoid sharing clothing, sports equipment, towels, brushes, and other personal items.    Wear flip flops when walking in a shower stall, a locker room, or pool area.    Some people are more prone to having a skin reaction when they contact the fungus than others and this maybe a lifelong reoccurrence for you

## 2024-01-28 ENCOUNTER — HEALTH MAINTENANCE LETTER (OUTPATIENT)
Age: 74
End: 2024-01-28

## 2024-09-26 ENCOUNTER — OFFICE VISIT (OUTPATIENT)
Dept: FAMILY MEDICINE | Facility: CLINIC | Age: 74
End: 2024-09-26
Payer: COMMERCIAL

## 2024-09-26 VITALS
OXYGEN SATURATION: 96 % | SYSTOLIC BLOOD PRESSURE: 120 MMHG | HEART RATE: 80 BPM | HEIGHT: 70 IN | DIASTOLIC BLOOD PRESSURE: 76 MMHG | WEIGHT: 188.2 LBS | BODY MASS INDEX: 26.94 KG/M2 | TEMPERATURE: 97.7 F

## 2024-09-26 DIAGNOSIS — M25.531 RIGHT WRIST PAIN: ICD-10-CM

## 2024-09-26 DIAGNOSIS — Z00.00 ROUTINE HISTORY AND PHYSICAL EXAMINATION OF ADULT: Primary | ICD-10-CM

## 2024-09-26 DIAGNOSIS — Z12.11 SCREEN FOR COLON CANCER: ICD-10-CM

## 2024-09-26 DIAGNOSIS — R79.89 ELEVATED FERRITIN: ICD-10-CM

## 2024-09-26 DIAGNOSIS — Z12.5 SCREENING FOR PROSTATE CANCER: ICD-10-CM

## 2024-09-26 DIAGNOSIS — J84.9 CHRONIC INTERSTITIAL LUNG DISEASE (H): ICD-10-CM

## 2024-09-26 LAB
ALBUMIN SERPL BCG-MCNC: 4 G/DL (ref 3.5–5.2)
ALP SERPL-CCNC: 81 U/L (ref 40–150)
ALT SERPL W P-5'-P-CCNC: 9 U/L (ref 0–70)
ANION GAP SERPL CALCULATED.3IONS-SCNC: 8 MMOL/L (ref 7–15)
AST SERPL W P-5'-P-CCNC: 29 U/L (ref 0–45)
BILIRUB SERPL-MCNC: 0.3 MG/DL
BUN SERPL-MCNC: 14.6 MG/DL (ref 8–23)
CALCIUM SERPL-MCNC: 9.3 MG/DL (ref 8.8–10.4)
CHLORIDE SERPL-SCNC: 95 MMOL/L (ref 98–107)
CREAT SERPL-MCNC: 0.89 MG/DL (ref 0.67–1.17)
EGFRCR SERPLBLD CKD-EPI 2021: 90 ML/MIN/1.73M2
ERYTHROCYTE [DISTWIDTH] IN BLOOD BY AUTOMATED COUNT: 14 % (ref 10–15)
FERRITIN SERPL-MCNC: 1042 NG/ML (ref 31–409)
GLUCOSE SERPL-MCNC: 91 MG/DL (ref 70–99)
HCO3 SERPL-SCNC: 28 MMOL/L (ref 22–29)
HCT VFR BLD AUTO: 37.8 % (ref 40–53)
HGB BLD-MCNC: 12.4 G/DL (ref 13.3–17.7)
IRON BINDING CAPACITY (ROCHE): 267 UG/DL (ref 240–430)
IRON SATN MFR SERPL: 15 % (ref 15–46)
IRON SERPL-MCNC: 41 UG/DL (ref 61–157)
MCH RBC QN AUTO: 27.4 PG (ref 26.5–33)
MCHC RBC AUTO-ENTMCNC: 32.8 G/DL (ref 31.5–36.5)
MCV RBC AUTO: 84 FL (ref 78–100)
PLATELET # BLD AUTO: 198 10E3/UL (ref 150–450)
POTASSIUM SERPL-SCNC: 4.9 MMOL/L (ref 3.4–5.3)
PROT SERPL-MCNC: 8.6 G/DL (ref 6.4–8.3)
PSA SERPL DL<=0.01 NG/ML-MCNC: 0.09 NG/ML (ref 0–6.5)
RBC # BLD AUTO: 4.52 10E6/UL (ref 4.4–5.9)
SODIUM SERPL-SCNC: 131 MMOL/L (ref 135–145)
WBC # BLD AUTO: 4.9 10E3/UL (ref 4–11)

## 2024-09-26 PROCEDURE — 99214 OFFICE O/P EST MOD 30 MIN: CPT | Mod: 25 | Performed by: FAMILY MEDICINE

## 2024-09-26 PROCEDURE — 82728 ASSAY OF FERRITIN: CPT | Performed by: FAMILY MEDICINE

## 2024-09-26 PROCEDURE — 83540 ASSAY OF IRON: CPT | Performed by: FAMILY MEDICINE

## 2024-09-26 PROCEDURE — G0439 PPPS, SUBSEQ VISIT: HCPCS | Performed by: FAMILY MEDICINE

## 2024-09-26 PROCEDURE — 83550 IRON BINDING TEST: CPT | Performed by: FAMILY MEDICINE

## 2024-09-26 PROCEDURE — G0103 PSA SCREENING: HCPCS | Performed by: FAMILY MEDICINE

## 2024-09-26 PROCEDURE — 80053 COMPREHEN METABOLIC PANEL: CPT | Performed by: FAMILY MEDICINE

## 2024-09-26 PROCEDURE — 36415 COLL VENOUS BLD VENIPUNCTURE: CPT | Performed by: FAMILY MEDICINE

## 2024-09-26 PROCEDURE — 85027 COMPLETE CBC AUTOMATED: CPT | Performed by: FAMILY MEDICINE

## 2024-09-26 ASSESSMENT — PAIN SCALES - GENERAL: PAINLEVEL: EXTREME PAIN (8)

## 2024-09-26 NOTE — PROGRESS NOTES
"Preventive Care Visit  [facility]  Leon Brooks MD, Family Medicine  Sep 26, 2024      Assessment & Plan     Routine history and physical examination of adult  Complains of right hand pain, hand weakness, experiencing symptoms about 1 month.  Physical examination as described.  Differentials discussed in detail including carpal tunnel syndrome.  Recommended over-the-counter oral/topical analgesia and orthopedic referral placed    Screen for colon cancer  - Fecal colorectal cancer screen FIT - Future (S+30); Future    Right wrist pain  As above  - Orthopedic  Referral; Future    Chronic interstitial lung disease (H)  Known to have silicotic fibrosis of lung, no pulmonary symptoms currently.    Screening for prostate cancer  - PSA, screen; Future    Elevated ferritin  As per patient, fer[facility] levels [facility].  Repeat labs ordered.  - CBC with platelets; Future  - Comprehensive metabolic panel (BMP + Alb, Alk Phos, ALT, AST, Total. Bili, TP); Future  - Ferritin; Future  - Iron and iron binding capacity; Future      BMI  Estimated body mass index is 27.39 kg/m  as calculated from the following:    Height as of this encounter: 1.765 m (5' 9.5\").    Weight as of this encounter: 85.4 kg (188 lb 3.2 oz).   Weight management plan: Discussed healthy diet and exercise guidelines    Counseling  Appropriate preventive services were addressed with this patient via screening, questionnaire, or discussion as appropriate for fall prevention, nutrition, physical activity, Tobacco-use cessation, social engagement, weight loss and cognition.  Checklist reviewing preventive services available has been given to the patient.  Reviewed patient's diet, addressing concerns and/or questions.   He is at risk for lack of exercise and has been provided with information to increase physical activity for the benefit of his well-being.   The patient was provided with written " information regarding signs of hearing loss.       Subjective   Tre is a 73 year old, presenting for the following:  Physical        9/26/2024     2:23 PM   Additional Questions   Roomed by Shakira HENDRIX LPN   Accompanied by Self         Health Care Directive  Patient does not have a Health Care Directive or Living Will: Patient states has Advance Directive and will bring in a copy to clinic.    HPI      Concern - Right wrist pain  Onset: month ago  Description: pain like pins up thru the hand no strength. Early morning fingers start to burn. Ice hot helps.   Intensity: severe  Progression of Symptoms:  worsening  Alleviating factors:        Improved by: Wrist brace  Therapies tried and outcome: None        9/25/2024   General Health   How would you rate your overall physical health? (!) DECLINE   Feel stress (tense, anxious, or unable to sleep) Not at all            9/25/2024   Nutrition   Diet: Regular (no restrictions)            9/25/2024   Exercise   Days per week of moderate/strenous exercise 3 days   Average minutes spent exercising at this level 50 min            9/25/2024   Social Factors   Frequency of gathering with friends or relatives More than three times a week   Worry food won't last until get money to buy more No   Food not last or not have enough money for food? No   Do you have housing? (Housing is defined as stable permanent housing and does not include staying ouside in a car, in a tent, in an abandoned building, in an overnight shelter, or couch-surfing.) Yes   Are you worried about losing your housing? No   Lack of transportation? No   Unable to get utilities (heat,electricity)? No            9/25/2024   Fall Risk   Fallen 2 or more times in the past year? No    No   Trouble with walking or balance? No    No       Multiple values from one day are sorted in reverse-chronological order          9/25/2024   Activities of Daily Living- Home Safety   Needs help with the following daily activites  None of the above   Safety concerns in the home None of the above            9/25/2024   Dental   Dentist two times every year? Yes            9/25/2024   Hearing Screening   Hearing concerns? (!) I NEED TO ASK PEOPLE TO SPEAK UP OR REPEAT THEMSELVES.    (!) IT'S HARD TO FOLLOW A CONVERSATION IN A NOISY RESTAURANT OR CROWDED ROOM.    (!) TROUBLE UNDESTANDING A SPEAKER IN A PUBLIC MEETING OR Church SERVICE.    (!) TROUBLE UNDERSTANDING SOFT OR WHISPERED SPEECH.    (!) TROUBLE UNDERSTANDING SPEECH ON THE TELEPHONE       Multiple values from one day are sorted in reverse-chronological order         9/25/2024   Driving Risk Screening   Patient/family members have concerns about driving No            9/25/2024   General Alertness/Fatigue Screening   Have you been more tired than usual lately? (!) DECLINE            9/25/2024   Urinary Incontinence Screening   Bothered by leaking urine in past 6 months No            9/25/2024   TB Screening   Were you born outside of the [place]? No            Today's PHQ-2 Score:       9/25/2024     4:37 PM   PHQ-2 ( 1999 [employer])   Q1: Little interest or pleasure in doing things 0   Q2: Feeling down, depressed or hopeless 0   PHQ-2 Score 0   Q1: Little interest or pleasure in doing things Not at all   Q2: Feeling down, depressed or hopeless Not at all   PHQ-2 Score 0           9/25/2024   Substance Use   Alcohol more than 3/day or more than 7/wk Not Applicable   Do you have a current opioid prescription? No   How severe/bad is pain from 1 to 10? 8/10   Do you use any other substances recreationally? No        Social History     Tobacco Use    Smoking status: Never    Smokeless tobacco: Never   Vaping Use    Vaping status: Never Used   Substance Use Topics    Alcohol use: Not Currently     Comment: rarely    Drug use: No           9/25/2024   AAA Screening   Family history of Abdominal Aortic Aneurysm (AAA)? No      ASCVD Risk   The 10-year ASCVD risk score (Crispin JUSTIN, et al.,  2019) is: 21.8%    Values used to calculate the score:      Age: 73 years      Sex: Male      Is Non- : No      Diabetic: No      Tobacco smoker: No      Systolic Blood Pressure: 120 mmHg      Is BP treated: No      HDL Cholesterol: 40 mg/dL      Total Cholesterol: 178 mg/dL          Reviewed and updated as needed this visit by Provider                    Past Medical History:   Diagnosis Date    Esophageal reflux     GERD     Past Surgical History:   Procedure Laterality Date    FLEXIBLE SIGMOIDOSCOPY  5/2000    Repeat 5 years     OB History   No obstetric history on file.     Lab work is in process  Labs reviewed in EPIC  BP Readings from Last 3 Encounters:   09/26/24 120/76   12/08/22 (!) 140/73   12/06/22 116/70    Wt Readings from Last 3 Encounters:   09/26/24 85.4 kg (188 lb 3.2 oz)   12/08/22 87.5 kg (193 lb)   12/06/22 87.5 kg (193 lb)                  Patient Active Problem List   Diagnosis    Esophageal reflux    CARDIOVASCULAR SCREENING; LDL GOAL LESS THAN 130    Venous stasis    Chronic interstitial lung disease (H)     Past Surgical History:   Procedure Laterality Date    FLEXIBLE SIGMOIDOSCOPY  5/2000    Repeat 5 years       Social History     Tobacco Use    Smoking status: Never    Smokeless tobacco: Never   Substance Use Topics    Alcohol use: Not Currently     Comment: rarely     Family History   Problem Relation Age of Onset    Diabetes Father     Hypertension Father     Prostate Cancer Father     Heart Disease Mother         older age    Cancer Mother         liver, breast and lung    Breast Cancer Mother     Colon Cancer Mother     Cancer - colorectal No family hx of          Current Outpatient Medications   Medication Sig Dispense Refill    cholecalciferol 25 MCG (1000 UT) TABS 125 mcg      magnesium oxide (MAG-OX) 400 MG tablet       vitamin C (ASCORBIC ACID) 1000 MG TABS       Zinc 100 MG TABS TAKE 30MG BY MOUTH DAILY       No Known Allergies  Recent Labs   Lab Test  "08/20/20  1058 11/07/18  1203 10/30/17  0916   * 109* 138*   HDL 40 43 51   TRIG 113 254* 104   CR 0.83  --   --    GFRESTIMATED 89  --   --    GFRESTBLACK >90  --   --    POTASSIUM 4.2  --   --       Current providers sharing in care for this patient include:  Patient Care Team:  No Ref-Primary, Physician as PCP - Maggie Moon MD as Assigned PCP    The following health maintenance items are reviewed in Epic and correct as of today:  Health Maintenance   Topic Date Due    ANNUAL REVIEW OF HM ORDERS  Never done    COLORECTAL CANCER SCREENING  12/18/2021    GLUCOSE  08/20/2023    MEDICARE ANNUAL WELLNESS VISIT  12/06/2023    INFLUENZA VACCINE (1) 09/01/2024    COVID-19 Vaccine (1 - 2024-25 season) Never done    RSV VACCINE (1 - 1-dose 75+ series) 11/25/2024    LIPID  08/20/2025    FALL RISK ASSESSMENT  09/26/2025    DTAP/TDAP/TD IMMUNIZATION (3 - Td or Tdap) 10/03/2027    ADVANCE CARE PLANNING  09/26/2029    HEPATITIS C SCREENING  Completed    PHQ-2 (once per calendar year)  Completed    Pneumococcal Vaccine: 65+ Years  Completed    ZOSTER IMMUNIZATION  Completed    HPV IMMUNIZATION  Aged Out    MENINGITIS IMMUNIZATION  Aged Out    RSV MONOCLONAL ANTIBODY  Aged Out         Review of Systems  Constitutional, neuro, ENT, endocrine, pulmonary, cardiac, gastrointestinal, genitourinary, musculoskeletal, integument and psychiatric systems are negative, except as otherwise noted.     Objective    Exam  /76   Pulse 80   Temp 97.7  F (36.5  C) (Tympanic)   Ht 1.765 m (5' 9.5\")   Wt 85.4 kg (188 lb 3.2 oz)   HC 20 cm (7.87\")   SpO2 96%   BMI 27.39 kg/m     Estimated body mass index is 27.39 kg/m  as calculated from the following:    Height as of this encounter: 1.765 m (5' 9.5\").    Weight as of this encounter: 85.4 kg (188 lb 3.2 oz).    Physical Exam  GENERAL: alert and no distress  EYES: Eyes grossly normal to inspection, PERRL and conjunctivae and sclerae normal  HENT: normal " cephalic/atraumatic, nose and mouth without ulcers or lesions, oropharynx clear, and oral mucous membranes moist  NECK: no adenopathy, no asymmetry, masses, or scars  RESP: lungs clear to auscultation - no rales, rhonchi or wheezes  CV: regular rates and rhythm, normal S1 S2, no S3 or S4, and no murmur, click or rub  ABDOMEN: soft, nontender  MS: weak right hand  strength, slight muscle atrophy noted, normal capillary refills, Tinel's sign equivocal  SKIN: no suspicious lesions or rashes  NEURO: Normal strength and tone, mentation intact and speech normal  PSYCH: mentation appears normal, affect normal/bright         9/26/2024   Mini Cog   Clock Draw Score 2 Normal   3 Item Recall 3 objects recalled   Mini Cog Total Score 5               Signed Electronically by: Leon Brooks MD

## 2024-09-27 ENCOUNTER — PATIENT OUTREACH (OUTPATIENT)
Dept: CARE COORDINATION | Facility: CLINIC | Age: 74
End: 2024-09-27
Payer: COMMERCIAL

## 2024-09-29 DIAGNOSIS — J84.9 CHRONIC INTERSTITIAL LUNG DISEASE (H): Primary | ICD-10-CM

## 2024-09-30 ENCOUNTER — PATIENT OUTREACH (OUTPATIENT)
Dept: CARE COORDINATION | Facility: CLINIC | Age: 74
End: 2024-09-30
Payer: COMMERCIAL

## 2024-10-03 DIAGNOSIS — D64.9 MILD ANEMIA: ICD-10-CM

## 2024-10-03 DIAGNOSIS — R79.89 ELEVATED FERRITIN: Primary | ICD-10-CM

## 2024-10-04 ENCOUNTER — PATIENT OUTREACH (OUTPATIENT)
Dept: ONCOLOGY | Facility: CLINIC | Age: 74
End: 2024-10-04
Payer: COMMERCIAL

## 2024-10-04 NOTE — PROGRESS NOTES
New Patient Oncology Nurse Navigator Note     Referral Received: 10/04/24      Referring provider: Leon Brooks MD     Referring Clinic/Organization: [facility]     Referred to: Benign Hematology    Requested provider (if applicable): First available - did not specify      Evaluation for :   Diagnosis   R79.89 (ICD-10-CM) - Elevated ferritin   D64.9 (ICD-10-CM) - Mild anemia      Clinical History (per Nurse review of records provided):       Latest Reference Range & Units 09/26/24 15:02   Sodium 135 - 145 mmol/L 131 (L)   Potassium 3.4 - 5.3 mmol/L 4.9   Chloride 98 - 107 mmol/L 95 (L)   Carbon Dioxide (CO2) 22 - 29 mmol/L 28   Urea Nitrogen 8.0 - 23.0 mg/dL 14.6   Creatinine 0.67 - 1.17 mg/dL 0.89   GFR Estimate >60 mL/min/1.73m2 90   Calcium 8.8 - 10.4 mg/dL 9.3   Anion Gap 7 - 15 mmol/L 8   Albumin 3.5 - 5.2 g/dL 4.0   Protein Total 6.4 - 8.3 g/dL 8.6 (H)   Alkaline Phosphatase 40 - 150 U/L 81   ALT 0 - 70 U/L 9   AST 0 - 45 U/L 29   Bilirubin Total <=1.2 mg/dL 0.3   Ferritin 31 - 409 ng/mL 1,042 (H)   Glucose 70 - 99 mg/dL 91   Iron 61 - 157 ug/dL 41 (L)   Iron Binding Capacity 240 - 430 ug/dL 267   Iron Sat Index 15 - 46 % 15   Prostate Specific Antigen Screen 0.00 - 6.50 ng/mL 0.09   WBC 4.0 - 11.0 10e3/uL 4.9   Hemoglobin 13.3 - 17.7 g/dL 12.4 (L)   Hematocrit 40.0 - 53.0 % 37.8 (L)   Platelet Count 150 - 450 10e3/uL 198   RBC Count 4.40 - 5.90 10e6/uL 4.52   MCV 78 - 100 fL 84   MCH 26.5 - 33.0 pg 27.4   MCHC 31.5 - 36.5 g/dL 32.8   RDW 10.0 - 15.0 % 14.0   (L): Data is abnormally low  (H): Data is abnormally high    Records Location: [facility]     Additional testing needed prior to consult:     ?    Referral updates and Plan:     10/04/2024 11:54 AM -  Referral received and reviewed. Message sent to NPS to schedule first available. Pt aware.       SAGE QuezadaN, RN  Hematology/Oncology Nurse Navigator  [facility]  [phone] / [phone]

## 2024-10-11 ENCOUNTER — ANCILLARY PROCEDURE (OUTPATIENT)
Dept: GENERAL RADIOLOGY | Facility: CLINIC | Age: 74
End: 2024-10-11
Attending: PEDIATRICS
Payer: COMMERCIAL

## 2024-10-11 ENCOUNTER — OFFICE VISIT (OUTPATIENT)
Dept: ORTHOPEDICS | Facility: CLINIC | Age: 74
End: 2024-10-11
Attending: FAMILY MEDICINE
Payer: COMMERCIAL

## 2024-10-11 DIAGNOSIS — M25.531 RIGHT WRIST PAIN: ICD-10-CM

## 2024-10-11 DIAGNOSIS — R20.0 NUMBNESS AND TINGLING IN RIGHT HAND: Primary | ICD-10-CM

## 2024-10-11 DIAGNOSIS — R20.2 NUMBNESS AND TINGLING IN RIGHT HAND: Primary | ICD-10-CM

## 2024-10-11 PROCEDURE — 73110 X-RAY EXAM OF WRIST: CPT | Mod: TC | Performed by: RADIOLOGY

## 2024-10-11 PROCEDURE — 99204 OFFICE O/P NEW MOD 45 MIN: CPT | Performed by: PEDIATRICS

## 2024-10-11 NOTE — LETTER
10/11/2024      Tre Swartz  [address] [facility] [address]      Dear Colleague,    Thank you for referring your patient, Tre Swartz, to the [facility] [place]. Please see a copy of my visit note below.    ASSESSMENT & PLAN    Tre was seen today for pain.    Diagnoses and all orders for this visit:    Numbness and tingling in right hand  -     EMG; Future    Right wrist pain  -     Orthopedic  Referral  -     XR Wrist Right G/E 3 Views; Future  -     EMG; Future      This issue is chronic and Unchanged.      ICD-10-CM    1. Numbness and tingling in right hand  R20.0 EMG    R20.2       2. Right wrist pain  M25.531 Orthopedic  Referral     XR Wrist Right G/E 3 Views     EMG        Patient Instructions   Discussed anatomy and pathophysiology of carpal tunnel. Discussed avoidance of exacerbating activities and use of braces.  Also discussed formal occupational hand therapy, potential referral for evaluation with EMG to evaluate severity.  Would consider referral pending course with treatment.    Plan:  - Today's Plan of Care:  Referral for EMG testing  Continue bracing    Discussed activity considerations and other supportive care including Ice/Heat, OTC and other topical medications as needed.    -We also discussed other future treatment options:  Referral to Hand Surgery  Referral for US guided injection  Referral to Hand Therapy    Follow Up: Send us a MyChart after you have the EMG    Concerning signs and symptoms were reviewed and all questions were answered at this time.    Thanks for the opportunity to participate in the care of this patient, I will keep you updated on their progress.    CC: Leon Palencia MD [facility]  Sports Medicine Physician  [facility]    -----  Chief Complaint   Patient presents with     Right Wrist - Pain       SUBJECTIVE  Tre Swartz is a/an 73 year old male who is seen in  consultation at the request of  Leon Brooks M.D. for evaluation of right wrist pain.     The patient is seen by themselves.  The patient is Right handed    Onset: 3 month(s) ago. Reports insidious onset without acute precipitating event.  Location of Pain: right hand weakness and burning  Worsened by: using the hand, driving, opening a can, milk carton  Better with: bracing  Treatments tried: casting/splinting/bracing  Associated symptoms: swelling, numbness, tingling, and weakness of right hand, feels like he is wearing a bracelet, bracing , burning in the fingers, 1st, 4th, 5tth    Orthopedic/Surgical history: YES - Date: chronic right hand pain   Social History/Occupation: retired       REVIEW OF SYSTEMS:  Review of Systems    OBJECTIVE:  There were no vitals taken for this visit.   General: healthy, alert and in no distress  Skin: no suspicious lesions or rash.  CV: distal perfusion intact   Resp: normal respiratory effort without conversational dyspnea   Psych: normal mood and affect  Gait: NORMAL  Neuro: Normal light sensory exam of upper extremity    Right Wrist and Hand exam    Inspection:       No swelling, bruising or deformity bilateral    Tender:       Volar wrist righ    Non Tender:       Remainder of the Wrist and Hand bilateral    ROM:       Slightly decreased full flexion and extension right    Strength:       5/5 strength in the muscles of the hand, wrist and forearm right    Special Tests:        positive (+) Tinel's test right and       positive (+) Phalen's test right    Neurovascular:       2+ radial pulses bilaterally with brisk capillary refill and      normal sensation to light touch in the radial, median and ulnar nerve distributions    RADIOLOGY:  Final results and radiologist's interpretation, available in the [facility] health record.  Images were reviewed with the patient in the office today.  My personal interpretation of the performed imaging:     3 XR views of right wrist  reviewed: no acute bony abnormality, CMC joint and mild wrist degenerative change  - will follow official read      Review of the result(s) of each unique test - XR            Again, thank you for allowing me to participate in the care of your patient.        Sincerely,        Denise Palencia MD

## 2024-10-11 NOTE — PATIENT INSTRUCTIONS
Discussed anatomy and pathophysiology of carpal tunnel. Discussed avoidance of exacerbating activities and use of braces.  Also discussed formal occupational hand therapy, potential referral for evaluation with EMG to evaluate severity.  Would consider referral pending course with treatment.    Plan:  - Today's Plan of Care:  Referral for EMG testing  Continue bracing    Discussed activity considerations and other supportive care including Ice/Heat, OTC and other topical medications as needed.    -We also discussed other future treatment options:  Referral to Hand Surgery  Referral for US guided injection  Referral to Hand Therapy    Follow Up: Send us a MyChart after you have the EMG    If you have any further questions for your physician or physician s care team you can call [phone] and use option 3 to leave a voice message.    [facility]  Address: [address] [place] [address]  Phone: [phone]  Fax: [phone]    [facility]  Address: [address], [place] [address]  Phone: [phone]  Fax: [phone]    [facility]  Dr. Fredy Dozier MD  Phone: [phone]  Fax: [phone]

## 2024-10-11 NOTE — PROGRESS NOTES
ASSESSMENT & PLAN    Tre was seen today for pain.    Diagnoses and all orders for this visit:    Numbness and tingling in right hand  -     EMG; Future    Right wrist pain  -     Orthopedic  Referral  -     XR Wrist Right G/E 3 Views; Future  -     EMG; Future      This issue is chronic and Unchanged.      ICD-10-CM    1. Numbness and tingling in right hand  R20.0 EMG    R20.2       2. Right wrist pain  M25.531 Orthopedic  Referral     XR Wrist Right G/E 3 Views     EMG        Patient Instructions   Discussed anatomy and pathophysiology of carpal tunnel. Discussed avoidance of exacerbating activities and use of braces.  Also discussed formal occupational hand therapy, potential referral for evaluation with EMG to evaluate severity.  Would consider referral pending course with treatment.    Plan:  - Today's Plan of Care:  Referral for EMG testing  Continue bracing    Discussed activity considerations and other supportive care including Ice/Heat, OTC and other topical medications as needed.    -We also discussed other future treatment options:  Referral to Hand Surgery  Referral for US guided injection  Referral to Hand Therapy    Follow Up: Send us a MyChart after you have the EMG    Concerning signs and symptoms were reviewed and all questions were answered at this time.    Thanks for the opportunity to participate in the care of this patient, I will keep you updated on their progress.    CC: Leon Palencia MD [facility]  Sports Medicine Physician  [facility]    -----  Chief Complaint   Patient presents with    Right Wrist - Pain       SUBJECTIVE  Tre Swartz is a/an 73 year old male who is seen in consultation at the request of  Leon Brooks M.D. for evaluation of right wrist pain.     The patient is seen by themselves.  The patient is Right handed    Onset: 3 month(s) ago. Reports insidious onset without acute precipitating event.  Location of  Pain: right hand weakness and burning  Worsened by: using the hand, driving, opening a can, milk carton  Better with: bracing  Treatments tried: casting/splinting/bracing  Associated symptoms: swelling, numbness, tingling, and weakness of right hand, feels like he is wearing a bracelet, bracing , burning in the fingers, 1st, 4th, 5tth    Orthopedic/Surgical history: YES - Date: chronic right hand pain   Social History/Occupation: retired       REVIEW OF SYSTEMS:  Review of Systems    OBJECTIVE:  There were no vitals taken for this visit.   General: healthy, alert and in no distress  Skin: no suspicious lesions or rash.  CV: distal perfusion intact   Resp: normal respiratory effort without conversational dyspnea   Psych: normal mood and affect  Gait: NORMAL  Neuro: Normal light sensory exam of upper extremity    Right Wrist and Hand exam    Inspection:       No swelling, bruising or deformity bilateral    Tender:       Volar wrist righ    Non Tender:       Remainder of the Wrist and Hand bilateral    ROM:       Slightly decreased full flexion and extension right    Strength:       5/5 strength in the muscles of the hand, wrist and forearm right    Special Tests:        positive (+) Tinel's test right and       positive (+) Phalen's test right    Neurovascular:       2+ radial pulses bilaterally with brisk capillary refill and      normal sensation to light touch in the radial, median and ulnar nerve distributions    RADIOLOGY:  Final results and radiologist's interpretation, available in the [facility] health record.  Images were reviewed with the patient in the office today.  My personal interpretation of the performed imaging:     3 XR views of right wrist reviewed: no acute bony abnormality, CMC joint and mild wrist degenerative change  - will follow official read      Review of the result(s) of each unique test - XR

## 2024-10-17 ENCOUNTER — TELEPHONE (OUTPATIENT)
Dept: ORTHOPEDICS | Facility: CLINIC | Age: 74
End: 2024-10-17
Payer: COMMERCIAL

## 2024-10-17 NOTE — TELEPHONE ENCOUNTER
EMG orders faxed to;     [facility]  Dr. Fredy Dozier MD  Phone: [phone]  Fax: [phone]    Delfina Anderson ATC, CSCS  Dr. Palencia's Clinical Coordinator  [facility]

## 2024-11-08 NOTE — TELEPHONE ENCOUNTER
RECORDS STATUS - ALL OTHER DIAGNOSIS      RECORDS RECEIVED FROM: [facility]   NOTES STATUS DETAILS   OFFICE NOTE from referring provider Epic 09/26/24: Dr. Leon Brooks   MEDICATION LIST [facility]    LABS     PATHOLOGY REPORTS     ANYTHING RELATED TO DIAGNOSIS Epic Most recent 09/26/24

## 2024-11-20 ENCOUNTER — ONCOLOGY VISIT (OUTPATIENT)
Dept: ONCOLOGY | Facility: CLINIC | Age: 74
End: 2024-11-20
Attending: FAMILY MEDICINE
Payer: COMMERCIAL

## 2024-11-20 ENCOUNTER — PRE VISIT (OUTPATIENT)
Dept: ONCOLOGY | Facility: CLINIC | Age: 74
End: 2024-11-20
Payer: COMMERCIAL

## 2024-11-20 VITALS
DIASTOLIC BLOOD PRESSURE: 70 MMHG | SYSTOLIC BLOOD PRESSURE: 122 MMHG | WEIGHT: 189 LBS | HEIGHT: 69 IN | TEMPERATURE: 98.1 F | BODY MASS INDEX: 27.99 KG/M2 | OXYGEN SATURATION: 96 % | RESPIRATION RATE: 12 BRPM | HEART RATE: 81 BPM

## 2024-11-20 DIAGNOSIS — D64.9 MILD ANEMIA: ICD-10-CM

## 2024-11-20 DIAGNOSIS — R79.89 ELEVATED FERRITIN: ICD-10-CM

## 2024-11-20 PROCEDURE — G0463 HOSPITAL OUTPT CLINIC VISIT: HCPCS | Performed by: INTERNAL MEDICINE

## 2024-11-20 ASSESSMENT — PAIN SCALES - GENERAL: PAINLEVEL_OUTOF10: NO PAIN (0)

## 2024-11-20 NOTE — LETTER
"11/20/2024      Tre Swartz  [id number] [facility] [address]      Dear Colleague,    Thank you for referring your patient, Tre Swartz, to the [facility] [place]. Please see a copy of my visit note below.    Oncology Rooming Note    November 20, 2024 2:54 PM   Tre Swartz is a 73 year old male who presents for:    Chief Complaint   Patient presents with     Hematology     Elevated ferritin - New consult     Initial Vitals: /70 (BP Location: Right arm, Patient Position: Sitting, Cuff Size: Adult Regular)   Pulse 81   Temp 98.1  F (36.7  C) (Tympanic)   Resp 12   Ht 1.753 m (5' 9\")   Wt 85.7 kg (189 lb)   SpO2 96%   BMI 27.91 kg/m   Estimated body mass index is 27.91 kg/m  as calculated from the following:    Height as of this encounter: 1.753 m (5' 9\").    Weight as of this encounter: 85.7 kg (189 lb). Body surface area is 2.04 meters squared.  No Pain (0) Comment: Data Unavailable   No LMP for male patient.  Allergies reviewed: Yes  Medications reviewed: Yes    Medications: Medication refills not needed today.  Pharmacy name entered into [employer]:    [facility] - [place], [place] - [address] [facility] - MAIL ORDER [facility] MEDS - NON-EPRESCRIBE  [facility] [place]  [id number] - [place], [place] - [address] [id number] - [place], [place] - [address] [facility] [address]  [facility] MAIL ORDER #[id number] - [facility] [address]    Frailty Screening:   Is the patient here for a new oncology consult visit in cancer care? 2. No      Clinical concerns:  New consult      Ravne Main CMA              [facility] Hematology and Oncology Consult Note    Patient: Tre Swartz  MRN: [id number]  Date of Service: Nov 20, 2024       Reason for Visit    I was consulted by Leon Brooks MD for evaluation of elevated ferritin.      Encounter Diagnoses Assessment and Plan:    Problem List Items " "Addressed This Visit       Elevated ferritin    Mild anemia     Patient found to have elevated ferritin as part of an investigation for mild anemia.  Patient has a history of silicosis with interstitial lung disease.  I suspect this is the cause of his chronic anemia and also elevated ferritin.  In 2012 patient had a sedimentation rate that was elevated.  In 2004 he had a similar hemoglobin.  His iron saturation is low normal.  This excludes hemochromatosis as a cause of hyper ferritin anemia.  I advised no additional workup at this time as the patient is asymptomatic.  He will follow-up with hematology as needed.      ______________________________________________________________________________        History of Present Illness    Mr. Tre Swartz is a 73 year old man with a history of silicosis.  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  He has dyspnea on exertion.  He is comfortable at rest.  He has been maintaining his weight.  He does not have chills, fevers or sweats.  He does not have cough, chest pain or shortness of breath at rest.  He has no change in bowel or bladder habits.  He can go about his daily activities without difficulty.  He does not use cigarettes or drink alcohol to excess.    Review of systems.  Pertinent Findings are included in the History of Present Illness    Physical Exam    /70 (BP Location: Right arm, Patient Position: Sitting, Cuff Size: Adult Regular)   Pulse 81   Temp 98.1  F (36.7  C) (Tympanic)   Resp 12   Ht 1.753 m (5' 9\")   Wt 85.7 kg (189 lb)   SpO2 96%   BMI 27.91 kg/m       GENERAL APPEARANCE: Healthy appearing male in no apparent distress.  HEAD: Atraumatic; normocephalic; without lesions.  EYES: Conjunctiva, corneas and eyelids normal; pupils equal, round, reactive to light; No Icterus.  MOUTH/OROPHARYNX: Oral mucosa intact  NECK: Supple with no nodes.  LUNGS:  Clear to auscultation and " percussion with no extra sounds.  HEART: Regular rhythm and rate; S1 and S2 normal; no murmurs noted.  ABDOMEN: Soft; no masses or tenderness, no hepatosplenomegaly.  NEUROLOGIC: Alert and oriented.  No obvious focal findings.  EXTREMITIES: No cyanosis, or edema.  SKIN: No abnormal bruising or bleeding. No suspicious lesions noted on exposed skin.  PSYCHIATRIC: Mental status normal; no apparent psychiatric issues    Medications:    Current Outpatient Medications   Medication Sig Dispense Refill     cholecalciferol 25 MCG (1000 UT) TABS 125 mcg       magnesium oxide (MAG-OX) 400 MG tablet        vitamin C (ASCORBIC ACID) 1000 MG TABS        Zinc 100 MG TABS TAKE 30MG BY MOUTH DAILY       No current facility-administered medications for this visit.           Past History    Past Medical History:   Diagnosis Date     Esophageal reflux     GERD     Past Surgical History:   Procedure Laterality Date     FLEXIBLE SIGMOIDOSCOPY  5/2000    Repeat 5 years     No Known Allergies  Family History   Problem Relation Age of Onset     Diabetes Father      Hypertension Father      Prostate Cancer Father      Heart Disease Mother         older age     Cancer Mother         liver, breast and lung     Breast Cancer Mother      Colon Cancer Mother      Cancer - colorectal No family hx of      Social History     Socioeconomic History     Marital status:      Spouse name: None     Number of children: None     Years of education: None     Highest education level: None   Tobacco Use     Smoking status: Never     Smokeless tobacco: Never   Vaping Use     Vaping status: Never Used   Substance and Sexual Activity     Alcohol use: Not Currently     Comment: rarely     Drug use: No     Sexual activity: Not Currently     Partners: Female     Birth control/protection: None   Other Topics Concern     Parent/sibling w/ CABG, MI or angioplasty before 65F 55M? No      Service Yes     Comment: army discharged in 70     Blood  Transfusions No     Caffeine Concern No     Occupational Exposure No     Hobby Hazards No     Sleep Concern No     Stress Concern No     Weight Concern No     Special Diet No     Back Care No     Exercise No     Comment: walking a lot     Bike Helmet No     Comment: encouraged to use a helmet     Seat Belt Yes     Self-Exams Yes     Social Drivers of Health     Financial Resource Strain: Low Risk  (9/25/2024)    Financial Resource Strain      Within the past 12 months, have you or your family members you live with been unable to get utilities (heat, electricity) when it was really needed?: No   Food Insecurity: Low Risk  (9/25/2024)    Food Insecurity      Within the past 12 months, did you worry that your food would run out before you got money to buy more?: No      Within the past 12 months, did the food you bought just not last and you didn t have money to get more?: No   Transportation Needs: Low Risk  (9/25/2024)    Transportation Needs      Within the past 12 months, has lack of transportation kept you from medical appointments, getting your medicines, non-medical meetings or appointments, work, or from getting things that you need?: No   Physical Activity: Sufficiently Active (9/25/2024)    Exercise Vital Sign      Days of Exercise per Week: 3 days      Minutes of Exercise per Session: 50 min   Stress: No Stress Concern Present (9/25/2024)    [employer] [place] [employer] - Occupational Stress Questionnaire      Feeling of Stress : Not at all   Social Connections: Unknown (9/25/2024)    Social Connection and Isolation Panel [NHANES]      Frequency of Social Gatherings with Friends and Family: More than three times a week   Interpersonal Safety: Low Risk  (9/26/2024)    Interpersonal Safety      Do you feel physically and emotionally safe where you currently live?: Yes      Within the past 12 months, have you been hit, slapped, kicked or otherwise physically hurt by someone?: No      Within the past  12 months, have you been humiliated or emotionally abused in other ways by your partner or ex-partner?: No   Housing Stability: Low Risk  (9/25/2024)    Housing Stability      Do you have housing? : Yes      Are you worried about losing your housing?: No           Lab Results  Component      Latest Ref Rng 9/26/2024  3:02 PM   WBC      4.0 - 11.0 10e3/uL 4.9    RBC Count      4.40 - 5.90 10e6/uL 4.52    Hemoglobin      13.3 - 17.7 g/dL 12.4 (L)    Hematocrit      40.0 - 53.0 % 37.8 (L)    MCV      78 - 100 fL 84    MCH      26.5 - 33.0 pg 27.4    MCHC      31.5 - 36.5 g/dL 32.8    RDW      10.0 - 15.0 % 14.0    Platelet Count      150 - 450 10e3/uL 198       Component      Latest Ref Rng 9/26/2024  3:02 PM   Ferritin      31 - 409 ng/mL 1,042 (H)       Component      Latest Ref Rng 9/26/2024  3:02 PM   Iron      61 - 157 ug/dL 41 (L)    Iron Binding Capacity      240 - 430 ug/dL 267    Iron Sat Index      15 - 46 % 15       Legend:  (L) Low      I spent 54 minutes on the patient's visit today.  This included preparation for the visit, face-to-face time with the patient and documentation following the visit.  It did not include teaching or procedure time.    Signed by: Peter E. Friedell, MD          Again, thank you for allowing me to participate in the care of your patient.        Sincerely,        Peter E. Friedell, MD

## 2024-11-20 NOTE — PROGRESS NOTES
"Oncology Rooming Note    November 20, 2024 2:54 PM   Tre Swartz is a 73 year old male who presents for:    Chief Complaint   Patient presents with    Hematology     Elevated ferritin - New consult     Initial Vitals: /70 (BP Location: Right arm, Patient Position: Sitting, Cuff Size: Adult Regular)   Pulse 81   Temp 98.1  F (36.7  C) (Tympanic)   Resp 12   Ht 1.753 m (5' 9\")   Wt 85.7 kg (189 lb)   SpO2 96%   BMI 27.91 kg/m   Estimated body mass index is 27.91 kg/m  as calculated from the following:    Height as of this encounter: 1.753 m (5' 9\").    Weight as of this encounter: 85.7 kg (189 lb). Body surface area is 2.04 meters squared.  No Pain (0) Comment: Data Unavailable   No LMP for male patient.  Allergies reviewed: Yes  Medications reviewed: Yes    Medications: Medication refills not needed today.  Pharmacy name entered into [employer]:    [facility] - [place], [place] - [address] [facility] - MAIL ORDER [facility] MEDS - NON-EPRESCRIBE  [facility] [place]  [id number] - [place], [place] - [address] [facility] PHARMACY [address] - [facility] [address] [facility] [address]  [facility] MAIL ORDER #[id number] - [facility] [place] - [address]    Frailty Screening:   Is the patient here for a new oncology consult visit in cancer care? 2. No      Clinical concerns:  New consult      Raven Main CMA            "

## 2024-11-20 NOTE — PROGRESS NOTES
"[facility] Hematology and Oncology Consult Note    Patient: Tre Swartz  MRN: [id number]  Date of Service: Nov 20, 2024       Reason for Visit    I was consulted by Leon Brooks MD for evaluation of elevated ferritin.      Encounter Diagnoses Assessment and Plan:    Problem List Items Addressed This Visit       Elevated ferritin    Mild anemia     Patient found to have elevated ferritin as part of an investigation for mild anemia.  Patient has a history of silicosis with interstitial lung disease.  I suspect this is the cause of his chronic anemia and also elevated ferritin.  In 2012 patient had a sedimentation rate that was elevated.  In 2004 he had a similar hemoglobin.  His iron saturation is low normal.  This excludes hemochromatosis as a cause of hyper ferritin anemia.  I advised no additional workup at this time as the patient is asymptomatic.  He will follow-up with hematology as needed.      ______________________________________________________________________________        History of Present Illness    Mr. Tre Swartz is a 73 year old man with a history of silicosis.  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  He has dyspnea on exertion.  He is comfortable at rest.  He has been maintaining his weight.  He does not have chills, fevers or sweats.  He does not have cough, chest pain or shortness of breath at rest.  He has no change in bowel or bladder habits.  He can go about his daily activities without difficulty.  He does not use cigarettes or drink alcohol to excess.    Review of systems.  Pertinent Findings are included in the History of Present Illness    Physical Exam    /70 (BP Location: Right arm, Patient Position: Sitting, Cuff Size: Adult Regular)   Pulse 81   Temp 98.1  F (36.7  C) (Tympanic)   Resp 12   Ht 1.753 m (5' 9\")   Wt 85.7 kg (189 lb)   SpO2 96%   BMI 27.91 kg/m       GENERAL APPEARANCE: " Healthy appearing male in no apparent distress.  HEAD: Atraumatic; normocephalic; without lesions.  EYES: Conjunctiva, corneas and eyelids normal; pupils equal, round, reactive to light; No Icterus.  MOUTH/OROPHARYNX: Oral mucosa intact  NECK: Supple with no nodes.  LUNGS:  Clear to auscultation and percussion with no extra sounds.  HEART: Regular rhythm and rate; S1 and S2 normal; no murmurs noted.  ABDOMEN: Soft; no masses or tenderness, no hepatosplenomegaly.  NEUROLOGIC: Alert and oriented.  No obvious focal findings.  EXTREMITIES: No cyanosis, or edema.  SKIN: No abnormal bruising or bleeding. No suspicious lesions noted on exposed skin.  PSYCHIATRIC: Mental status normal; no apparent psychiatric issues    Medications:    Current Outpatient Medications   Medication Sig Dispense Refill    cholecalciferol 25 MCG (1000 UT) TABS 125 mcg      magnesium oxide (MAG-OX) 400 MG tablet       vitamin C (ASCORBIC ACID) 1000 MG TABS       Zinc 100 MG TABS TAKE 30MG BY MOUTH DAILY       No current facility-administered medications for this visit.           Past History    Past Medical History:   Diagnosis Date    Esophageal reflux     GERD     Past Surgical History:   Procedure Laterality Date    FLEXIBLE SIGMOIDOSCOPY  5/2000    Repeat 5 years     No Known Allergies  Family History   Problem Relation Age of Onset    Diabetes Father     Hypertension Father     Prostate Cancer Father     Heart Disease Mother         older age    Cancer Mother         liver, breast and lung    Breast Cancer Mother     Colon Cancer Mother     Cancer - colorectal No family hx of      Social History     Socioeconomic History    Marital status:      Spouse name: None    Number of children: None    Years of education: None    Highest education level: None   Tobacco Use    Smoking status: Never    Smokeless tobacco: Never   Vaping Use    Vaping status: Never Used   Substance and Sexual Activity    Alcohol use: Not Currently     Comment:  rarely    Drug use: No    Sexual activity: Not Currently     Partners: Female     Birth control/protection: None   Other Topics Concern    Parent/sibling w/ CABG, MI or angioplasty before 65F 55M? No     Service Yes     Comment: army discharged in 70    Blood Transfusions No    Caffeine Concern No    Occupational Exposure No    Hobby Hazards No    Sleep Concern No    Stress Concern No    Weight Concern No    Special Diet No    Back Care No    Exercise No     Comment: walking a lot    Bike Helmet No     Comment: encouraged to use a helmet    Seat Belt Yes    Self-Exams Yes     Social Drivers of Health     Financial Resource Strain: Low Risk  (9/25/2024)    Financial Resource Strain     Within the past 12 months, have you or your family members you live with been unable to get utilities (heat, electricity) when it was really needed?: No   Food Insecurity: Low Risk  (9/25/2024)    Food Insecurity     Within the past 12 months, did you worry that your food would run out before you got money to buy more?: No     Within the past 12 months, did the food you bought just not last and you didn t have money to get more?: No   Transportation Needs: Low Risk  (9/25/2024)    Transportation Needs     Within the past 12 months, has lack of transportation kept you from medical appointments, getting your medicines, non-medical meetings or appointments, work, or from getting things that you need?: No   Physical Activity: Sufficiently Active (9/25/2024)    Exercise Vital Sign     Days of Exercise per Week: 3 days     Minutes of Exercise per Session: 50 min   Stress: No Stress Concern Present (9/25/2024)    [employer] [place] [employer] - Occupational Stress Questionnaire     Feeling of Stress : Not at all   Social Connections: Unknown (9/25/2024)    Social Connection and Isolation Panel [NHANES]     Frequency of Social Gatherings with Friends and Family: More than three times a week   Interpersonal Safety: Low Risk   (9/26/2024)    Interpersonal Safety     Do you feel physically and emotionally safe where you currently live?: Yes     Within the past 12 months, have you been hit, slapped, kicked or otherwise physically hurt by someone?: No     Within the past 12 months, have you been humiliated or emotionally abused in other ways by your partner or ex-partner?: No   Housing Stability: Low Risk  (9/25/2024)    Housing Stability     Do you have housing? : Yes     Are you worried about losing your housing?: No           Lab Results  Component      Latest Ref Rng 9/26/2024  3:02 PM   WBC      4.0 - 11.0 10e3/uL 4.9    RBC Count      4.40 - 5.90 10e6/uL 4.52    Hemoglobin      13.3 - 17.7 g/dL 12.4 (L)    Hematocrit      40.0 - 53.0 % 37.8 (L)    MCV      78 - 100 fL 84    MCH      26.5 - 33.0 pg 27.4    MCHC      31.5 - 36.5 g/dL 32.8    RDW      10.0 - 15.0 % 14.0    Platelet Count      150 - 450 10e3/uL 198       Component      Latest Ref Rng 9/26/2024  3:02 PM   Ferritin      31 - 409 ng/mL 1,042 (H)       Component      Latest Ref Rng 9/26/2024  3:02 PM   Iron      61 - 157 ug/dL 41 (L)    Iron Binding Capacity      240 - 430 ug/dL 267    Iron Sat Index      15 - 46 % 15       Legend:  (L) Low      I spent 54 minutes on the patient's visit today.  This included preparation for the visit, face-to-face time with the patient and documentation following the visit.  It did not include teaching or procedure time.    Signed by: Peter E. Friedell, MD

## 2024-11-26 ENCOUNTER — TRANSFERRED RECORDS (OUTPATIENT)
Dept: HEALTH INFORMATION MANAGEMENT | Facility: CLINIC | Age: 74
End: 2024-11-26
Payer: COMMERCIAL

## 2024-12-09 ENCOUNTER — PATIENT OUTREACH (OUTPATIENT)
Dept: CARE COORDINATION | Facility: CLINIC | Age: 74
End: 2024-12-09
Payer: COMMERCIAL

## 2024-12-09 NOTE — H&P (VIEW-ONLY)
CHIEF COMPLAINT:   Chief Complaint   Patient presents with    Right Wrist - Cts     Onset: 6/2024. NKI. RHD. Patient notes his wrist feels like pin cushions. He will have some numbness at the tip of the fingers. If he uses his right hand the next day he will have pain in the hand. Loss of strength, has trouble grasping, when he holds things his hand will shake. He was told he had carpal tunnel syndrome. He would like to discuss surgery. EMG with [facility] on 11/26/24.       Tre Swartz is seen today in the [facility] for evaluation of right hand pain, numbness and tingling  at the request of Dr. Denise Palencia         HISTORY:  Tre Swartz is a 74 year old male , Right -hand dominant who is seen in for Right hand numbness and tingling, pain, and weakness x 6 months, ~6/2024. No injury. Wrist feels like pin cushion, pins poking, numbness tip of the fingers. If he uses the right hand will have pain the next day. Feels like his  strength is worsening, difficulties with grasping, holding things. His hand will shake. He was told he had carpal tunnel syndrome, had EMG with [facility] 11/26/2024.     He has numbness and tingling in radial 4 digits.       Worse in the morning, night.  Can't make a tight fist.    Wears a brace at night, does help quite a bit.    Seldom use of advil as needed.    Left hand is ok.    Recent episode of vertigo.      Suspected cause: Due to unknown factors.  Maybe pulling weeds.  Pain severity: 7/10  Pain quality: dull, aching, stabbing, and burning  Frequency of symptoms: frequently.  Aggravating Factors: usage, night.  Relieving Factors: rest, brace.  Previous modalities tried: a splint and NSAIDs   Usual level of work activity: retired     Other PMH:  has a past medical history of Esophageal reflux.  Patient Active Problem List   Diagnosis    Esophageal reflux    CARDIOVASCULAR SCREENING; LDL GOAL LESS THAN 130    Venous stasis     "Chronic interstitial lung disease (H)    Elevated ferritin    Mild anemia       Surgical Hx:  has a past surgical history that includes flexible sigmoidoscopy (5/2000).    Medications:   Current Outpatient Medications:     cholecalciferol 25 MCG (1000 UT) TABS, 125 mcg, Disp: , Rfl:     magnesium oxide (MAG-OX) 400 MG tablet, , Disp: , Rfl:     vitamin C (ASCORBIC ACID) 1000 MG TABS, , Disp: , Rfl:     Zinc 100 MG TABS, TAKE 30MG BY MOUTH DAILY, Disp: , Rfl:     Allergies: No Known Allergies    Social Hx: retired sandblaster.  reports that he has never smoked. He has never used smokeless tobacco. He reports that he does not currently use alcohol. He reports that he does not use drugs.    Family Hx: family history includes Breast Cancer in his mother; Cancer in his mother; Colon Cancer in his mother; Diabetes in his father; Heart Disease in his mother; Hypertension in his father; Prostate Cancer in his father..    REVIEW OF SYSTEMS: 10 point ROS neg other than the symptoms noted above in the HPI and PMH. Notables include  CONSTITUTIONAL:NEGATIVE for fever, chills, change in weight  INTEGUMENTARY/SKIN: NEGATIVE for worrisome rashes, moles or lesions  MUSCULOSKELETAL:See HPI above  Neurology: see HPI above.      EXAM:  /80   Pulse 89   Ht 1.753 m (5' 9\")   Wt 87.6 kg (193 lb 3.2 oz)   BMI 28.53 kg/m    GENERAL APPEARANCE: healthy, alert and no distress ; accompanied by his wife.  GAIT: NORMAL  SKIN: no suspicious lesions or rashes  RESPIRATORY: No increased work of breathing.  NEURO:     strength: decreased,    thenar fasiculations: negative    Thenar atrophy:Moderate.    Sensation intact in  all digits,    reflexes normal in upper extremities.   PSYCH:  mentation appears normal and affect normal, not anxious.    MUSCULOSKELETAL:    RIGHT HAND/FINGERS:    Skin intact. No abnormal skin discoloration, erythema or ecchymosis.   No nail pitting or clubbing.  Normal wear pattern, color and tone.  No observable " or palpable masses of the fingers or palm or wrist.  No palpable triggering of fingers.   No observable or palpable cords or nodules of the fingers or palm.    There is mild swelling in the wrist, hand, forearm.  There is mild tenderness in the wrist.  There is no ecchymosis.  There is no erythema of the surrounding skin.  There is no maceration of the skin.  There is no deformity in the area.  Intact extensors. No extensor lag.    Special tests wrist:    Tinel's negative,    Phalen's negative.    Flexion/compression test equivocal.   Finkelstein's test: negative.       1st carpometacarpal grind: positive     Intact sensation light touch median, radial, ulnar nerves of the hand  Intact sensation to the radial and ulnar digital nerves of the fingers, as well as the finger tips.  Intact epl fpl fdp edc wrist flexion/extension biceps/triceps deltoid  Brisk capillary refill to all fingers.   Palpable radial pulse, 2+.      LEFT HAND/FINGERS:    Skin intact. No abnormal skin discoloration, erythema or ecchymosis.   No nail pitting or clubbing.  Normal wear pattern, color and tone.  No observable or palpable masses of the fingers or palm or wrist.  No palpable triggering of fingers.   No observable or palpable cords or nodules of the fingers or palm.    There is no swelling in the wrist, hand, forearm.  There is no tenderness in the wrist.  There is no ecchymosis.  There is no erythema of the surrounding skin.  There is no maceration of the skin.  There is no gross deformity in the area.  Intact extensors. No extensor lag.    Special tests wrist:    Tinel's negative,    Phalen's negative.    Flexion/compression test negative.   Finkelstein's test: negative.     Intact sensation light touch median, radial, ulnar nerves of the hand  Intact sensation to the radial and ulnar digital nerves of the fingers, as well as the finger tips.  Intact epl fpl fdp edc wrist flexion/extension biceps/triceps deltoid  Brisk capillary refill  to all fingers.   Palpable radial pulse, 2+.      XRAYS: right wrist 10/11/2024  No acute fracture or malalignment. Moderate first carpometacarpal joint degenerative changes. Osteopenia. Mild chronic deformity of the fifth metacarpal.       SPECIAL STUDIES:   EMG RESULTS @ [facility] 11/26/24;  Interpretation of findings;   This is an abnormal study.   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 There is Electrodiagnostic evidence of motor and sensory fiber demyelination without axon loss.  There is no electrodiagnostic evidence of right ulnar neuropathy at the elbow, right cervical radiculopathy, or of a generalized neuropathy affecting the right upper limb.       ASSESSMENT/PLAN: 75yo RHD male with chronic right hand and wrist pain, carpal tunnel syndrome, right thumb carpometacarpal osteoarthritis .    * discussed with patient signs and symptoms consistent with carpal tunnel syndrome. Carpal tunnel syndrome is compression or pinching of the median nerve at the wrist, as it enters the hand. There are many different causes, and in most cases, multifactorial.    * An indepth discussion was had with him about the options for treatment, which included activity modification to avoid aggravating activities, taking breaks during activities that cause symptoms, stretching, NSAIDS to help decrease inflammation and swelling within the carpal tunnel, night splinting, corticosteroid injections, and carpal tunnel release.   * depending upon severity and duration of symptoms, nonoperative treatment is usually initiated, starting with least invasive modalities such as activity modification and a trial of night splints and NSAIDs.  * Cortisone injections are considered to decrease swelling and inflammation within the carpal tunnel and compression of the nerve.   * Lastly, carpal tunnel release should symptoms persist despite trial of nonoperative treatment, or in cases of severe carpal tunnel syndrome.  * risks of surgery, including,  but not limited to: bleeding, infection, pain, scar, damage to adjacent structures (nerves, vessels, tendons), temporary versus permanent nerve injury, failure to relieve symptoms, recurrence of symptoms, incomplete release, stiffness, scar sensitivity and tenderness, need for further surgery, risks of anesthesia were discussed.  * in some cases, with severe, prolonged symptoms, or in situations of underlying peripheral neuropathy, or cervical radiculopahty, there may be permanent nerve changes not amenable to surgery, that even with surgery, may not resolve.  * in some cases, it may take 6 months to a year or longer for symptoms to improve or resolve, but even then may not completely resolve.    * plan: RIGHT open carpal tunnel release, outpatient procedure, MAC with local anesthesia.  * will schedule in future at a mutual convenience  * patient to obtain H+P prior to surgery  * return to clinic 2 weeks postoperative for wound check, suture removal, sooner if needed..  * all patient's questions addressed and answered today.      Navin Jiménez M.D., M.S.  Dept. of Orthopaedic Surgery  [facility]

## 2024-12-09 NOTE — PROGRESS NOTES
CHIEF COMPLAINT:   Chief Complaint   Patient presents with    Right Wrist - Cts     Onset: 6/2024. NKI. RHD. Patient notes his wrist feels like pin cushions. He will have some numbness at the tip of the fingers. If he uses his right hand the next day he will have pain in the hand. Loss of strength, has trouble grasping, when he holds things his hand will shake. He was told he had carpal tunnel syndrome. He would like to discuss surgery. EMG with [facility] on 11/26/24.       Tre Swartz is seen today in the [facility] for evaluation of right hand pain, numbness and tingling  at the request of Dr. Denise Palencia         HISTORY:  Tre Swartz is a 74 year old male , Right -hand dominant who is seen in for Right hand numbness and tingling, pain, and weakness x 6 months, ~6/2024. No injury. Wrist feels like pin cushion, pins poking, numbness tip of the fingers. If he uses the right hand will have pain the next day. Feels like his  strength is worsening, difficulties with grasping, holding things. His hand will shake. He was told he had carpal tunnel syndrome, had EMG with [facility] 11/26/2024.     He has numbness and tingling in radial 4 digits.       Worse in the morning, night.  Can't make a tight fist.    Wears a brace at night, does help quite a bit.    Seldom use of advil as needed.    Left hand is ok.    Recent episode of vertigo.      Suspected cause: Due to unknown factors.  Maybe pulling weeds.  Pain severity: 7/10  Pain quality: dull, aching, stabbing, and burning  Frequency of symptoms: frequently.  Aggravating Factors: usage, night.  Relieving Factors: rest, brace.  Previous modalities tried: a splint and NSAIDs   Usual level of work activity: retired     Other PMH:  has a past medical history of Esophageal reflux.  Patient Active Problem List   Diagnosis    Esophageal reflux    CARDIOVASCULAR SCREENING; LDL GOAL LESS THAN 130    Venous stasis     "Chronic interstitial lung disease (H)    Elevated ferritin    Mild anemia       Surgical Hx:  has a past surgical history that includes flexible sigmoidoscopy (5/2000).    Medications:   Current Outpatient Medications:     cholecalciferol 25 MCG (1000 UT) TABS, 125 mcg, Disp: , Rfl:     magnesium oxide (MAG-OX) 400 MG tablet, , Disp: , Rfl:     vitamin C (ASCORBIC ACID) 1000 MG TABS, , Disp: , Rfl:     Zinc 100 MG TABS, TAKE 30MG BY MOUTH DAILY, Disp: , Rfl:     Allergies: No Known Allergies    Social Hx: retired sandblaster.  reports that he has never smoked. He has never used smokeless tobacco. He reports that he does not currently use alcohol. He reports that he does not use drugs.    Family Hx: family history includes Breast Cancer in his mother; Cancer in his mother; Colon Cancer in his mother; Diabetes in his father; Heart Disease in his mother; Hypertension in his father; Prostate Cancer in his father..    REVIEW OF SYSTEMS: 10 point ROS neg other than the symptoms noted above in the HPI and PMH. Notables include  CONSTITUTIONAL:NEGATIVE for fever, chills, change in weight  INTEGUMENTARY/SKIN: NEGATIVE for worrisome rashes, moles or lesions  MUSCULOSKELETAL:See HPI above  Neurology: see HPI above.      EXAM:  /80   Pulse 89   Ht 1.753 m (5' 9\")   Wt 87.6 kg (193 lb 3.2 oz)   BMI 28.53 kg/m    GENERAL APPEARANCE: healthy, alert and no distress ; accompanied by his wife.  GAIT: NORMAL  SKIN: no suspicious lesions or rashes  RESPIRATORY: No increased work of breathing.  NEURO:     strength: decreased,    thenar fasiculations: negative    Thenar atrophy:Moderate.    Sensation intact in  all digits,    reflexes normal in upper extremities.   PSYCH:  mentation appears normal and affect normal, not anxious.    MUSCULOSKELETAL:    RIGHT HAND/FINGERS:    Skin intact. No abnormal skin discoloration, erythema or ecchymosis.   No nail pitting or clubbing.  Normal wear pattern, color and tone.  No observable " or palpable masses of the fingers or palm or wrist.  No palpable triggering of fingers.   No observable or palpable cords or nodules of the fingers or palm.    There is mild swelling in the wrist, hand, forearm.  There is mild tenderness in the wrist.  There is no ecchymosis.  There is no erythema of the surrounding skin.  There is no maceration of the skin.  There is no deformity in the area.  Intact extensors. No extensor lag.    Special tests wrist:    Tinel's negative,    Phalen's negative.    Flexion/compression test equivocal.   Finkelstein's test: negative.       1st carpometacarpal grind: positive     Intact sensation light touch median, radial, ulnar nerves of the hand  Intact sensation to the radial and ulnar digital nerves of the fingers, as well as the finger tips.  Intact epl fpl fdp edc wrist flexion/extension biceps/triceps deltoid  Brisk capillary refill to all fingers.   Palpable radial pulse, 2+.      LEFT HAND/FINGERS:    Skin intact. No abnormal skin discoloration, erythema or ecchymosis.   No nail pitting or clubbing.  Normal wear pattern, color and tone.  No observable or palpable masses of the fingers or palm or wrist.  No palpable triggering of fingers.   No observable or palpable cords or nodules of the fingers or palm.    There is no swelling in the wrist, hand, forearm.  There is no tenderness in the wrist.  There is no ecchymosis.  There is no erythema of the surrounding skin.  There is no maceration of the skin.  There is no gross deformity in the area.  Intact extensors. No extensor lag.    Special tests wrist:    Tinel's negative,    Phalen's negative.    Flexion/compression test negative.   Finkelstein's test: negative.     Intact sensation light touch median, radial, ulnar nerves of the hand  Intact sensation to the radial and ulnar digital nerves of the fingers, as well as the finger tips.  Intact epl fpl fdp edc wrist flexion/extension biceps/triceps deltoid  Brisk capillary refill  to all fingers.   Palpable radial pulse, 2+.      XRAYS: right wrist 10/11/2024  No acute fracture or malalignment. Moderate first carpometacarpal joint degenerative changes. Osteopenia. Mild chronic deformity of the fifth metacarpal.       SPECIAL STUDIES:   EMG RESULTS @ [facility] 11/26/24;  Interpretation of findings;   This is an abnormal study.   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 There is Electrodiagnostic evidence of motor and sensory fiber demyelination without axon loss.  There is no electrodiagnostic evidence of right ulnar neuropathy at the elbow, right cervical radiculopathy, or of a generalized neuropathy affecting the right upper limb.       ASSESSMENT/PLAN: 75yo RHD male with chronic right hand and wrist pain, carpal tunnel syndrome, right thumb carpometacarpal osteoarthritis .    * discussed with patient signs and symptoms consistent with carpal tunnel syndrome. Carpal tunnel syndrome is compression or pinching of the median nerve at the wrist, as it enters the hand. There are many different causes, and in most cases, multifactorial.    * An indepth discussion was had with him about the options for treatment, which included activity modification to avoid aggravating activities, taking breaks during activities that cause symptoms, stretching, NSAIDS to help decrease inflammation and swelling within the carpal tunnel, night splinting, corticosteroid injections, and carpal tunnel release.   * depending upon severity and duration of symptoms, nonoperative treatment is usually initiated, starting with least invasive modalities such as activity modification and a trial of night splints and NSAIDs.  * Cortisone injections are considered to decrease swelling and inflammation within the carpal tunnel and compression of the nerve.   * Lastly, carpal tunnel release should symptoms persist despite trial of nonoperative treatment, or in cases of severe carpal tunnel syndrome.  * risks of surgery, including,  but not limited to: bleeding, infection, pain, scar, damage to adjacent structures (nerves, vessels, tendons), temporary versus permanent nerve injury, failure to relieve symptoms, recurrence of symptoms, incomplete release, stiffness, scar sensitivity and tenderness, need for further surgery, risks of anesthesia were discussed.  * in some cases, with severe, prolonged symptoms, or in situations of underlying peripheral neuropathy, or cervical radiculopahty, there may be permanent nerve changes not amenable to surgery, that even with surgery, may not resolve.  * in some cases, it may take 6 months to a year or longer for symptoms to improve or resolve, but even then may not completely resolve.    * plan: RIGHT open carpal tunnel release, outpatient procedure, MAC with local anesthesia.  * will schedule in future at a mutual convenience  * patient to obtain H+P prior to surgery  * return to clinic 2 weeks postoperative for wound check, suture removal, sooner if needed..  * all patient's questions addressed and answered today.      Navin Jiménez M.D., M.S.  Dept. of Orthopaedic Surgery  [facility]

## 2024-12-11 ENCOUNTER — TELEPHONE (OUTPATIENT)
Dept: SURGERY | Facility: CLINIC | Age: 74
End: 2024-12-11

## 2024-12-11 ENCOUNTER — OFFICE VISIT (OUTPATIENT)
Dept: ORTHOPEDICS | Facility: CLINIC | Age: 74
End: 2024-12-11
Attending: PEDIATRICS
Payer: COMMERCIAL

## 2024-12-11 ENCOUNTER — PREP FOR PROCEDURE (OUTPATIENT)
Dept: SURGERY | Facility: CLINIC | Age: 74
End: 2024-12-11

## 2024-12-11 VITALS
BODY MASS INDEX: 28.61 KG/M2 | WEIGHT: 193.2 LBS | SYSTOLIC BLOOD PRESSURE: 122 MMHG | HEART RATE: 89 BPM | HEIGHT: 69 IN | DIASTOLIC BLOOD PRESSURE: 80 MMHG

## 2024-12-11 DIAGNOSIS — R20.2 NUMBNESS AND TINGLING IN RIGHT HAND: ICD-10-CM

## 2024-12-11 DIAGNOSIS — R20.0 NUMBNESS AND TINGLING IN RIGHT HAND: ICD-10-CM

## 2024-12-11 DIAGNOSIS — G56.01 CARPAL TUNNEL SYNDROME OF RIGHT WRIST: Primary | ICD-10-CM

## 2024-12-11 DIAGNOSIS — M25.531 RIGHT WRIST PAIN: ICD-10-CM

## 2024-12-11 PROCEDURE — 99203 OFFICE O/P NEW LOW 30 MIN: CPT | Performed by: ORTHOPAEDIC SURGERY

## 2024-12-11 ASSESSMENT — PAIN SCALES - GENERAL: PAINLEVEL_OUTOF10: SEVERE PAIN (7)

## 2024-12-11 NOTE — TELEPHONE ENCOUNTER
Type of surgery: CTR  Location of surgery: [place] OR  Date and time of surgery: 12-31-24  Surgeon: Tino  Pre-Op Appt Date: Not needed- local  Post-Op Appt Date:    Packet sent out: YES  Pre-cert/Authorization completed:    Date:

## 2024-12-11 NOTE — LETTER
12/11/2024      Tre Swartz  [address] [facility] [address]      Dear Colleague,    Thank you for referring your patient, Tre Swartz, to the [facility] [place]. Please see a copy of my visit note below.    CHIEF COMPLAINT:   Chief Complaint   Patient presents with     Right Wrist - Cts     Onset: 6/2024. NKI. RHD. Patient notes his wrist feels like pin cushions. He will have some numbness at the tip of the fingers. If he uses his right hand the next day he will have pain in the hand. Loss of strength, has trouble grasping, when he holds things his hand will shake. He was told he had carpal tunnel syndrome. He would like to discuss surgery. EMG with [facility] on 11/26/24.       Tre Swartz is seen today in the [facility] for evaluation of right hand pain, numbness and tingling  at the request of Dr. Denise Palencia         HISTORY:  Tre Swartz is a 74 year old male , Right -hand dominant who is seen in for Right hand numbness and tingling, pain, and weakness x 6 months, ~6/2024. No injury. Wrist feels like pin cushion, pins poking, numbness tip of the fingers. If he uses the right hand will have pain the next day. Feels like his  strength is worsening, difficulties with grasping, holding things. His hand will shake. He was told he had carpal tunnel syndrome, had EMG with [facility] 11/26/2024.     He has numbness and tingling in radial 4 digits.       Worse in the morning, night.  Can't make a tight fist.    Wears a brace at night, does help quite a bit.    Seldom use of advil as needed.    Left hand is ok.    Recent episode of vertigo.      Suspected cause: Due to unknown factors.  Maybe pulling weeds.  Pain severity: 7/10  Pain quality: dull, aching, stabbing, and burning  Frequency of symptoms: frequently.  Aggravating Factors: usage, night.  Relieving Factors: rest, brace.  Previous modalities tried: a splint and  "NSAIDs   Usual level of work activity: retired     Other PMH:  has a past medical history of Esophageal reflux.  Patient Active Problem List   Diagnosis     Esophageal reflux     CARDIOVASCULAR SCREENING; LDL GOAL LESS THAN 130     Venous stasis     Chronic interstitial lung disease (H)     Elevated ferritin     Mild anemia       Surgical Hx:  has a past surgical history that includes flexible sigmoidoscopy (5/2000).    Medications:   Current Outpatient Medications:      cholecalciferol 25 MCG (1000 UT) TABS, 125 mcg, Disp: , Rfl:      magnesium oxide (MAG-OX) 400 MG tablet, , Disp: , Rfl:      vitamin C (ASCORBIC ACID) 1000 MG TABS, , Disp: , Rfl:      Zinc 100 MG TABS, TAKE 30MG BY MOUTH DAILY, Disp: , Rfl:     Allergies: No Known Allergies    Social Hx: retired sandblaster.  reports that he has never smoked. He has never used smokeless tobacco. He reports that he does not currently use alcohol. He reports that he does not use drugs.    Family Hx: family history includes Breast Cancer in his mother; Cancer in his mother; Colon Cancer in his mother; Diabetes in his father; Heart Disease in his mother; Hypertension in his father; Prostate Cancer in his father..    REVIEW OF SYSTEMS: 10 point ROS neg other than the symptoms noted above in the HPI and PMH. Notables include  CONSTITUTIONAL:NEGATIVE for fever, chills, change in weight  INTEGUMENTARY/SKIN: NEGATIVE for worrisome rashes, moles or lesions  MUSCULOSKELETAL:See HPI above  Neurology: see HPI above.      EXAM:  /80   Pulse 89   Ht 1.753 m (5' 9\")   Wt 87.6 kg (193 lb 3.2 oz)   BMI 28.53 kg/m    GENERAL APPEARANCE: healthy, alert and no distress ; accompanied by his wife.  GAIT: NORMAL  SKIN: no suspicious lesions or rashes  RESPIRATORY: No increased work of breathing.  NEURO:     strength: decreased,    thenar fasiculations: negative    Thenar atrophy:Moderate.    Sensation intact in  all digits,    reflexes normal in upper extremities.   PSYCH:  " mentation appears normal and affect normal, not anxious.    MUSCULOSKELETAL:    RIGHT HAND/FINGERS:    Skin intact. No abnormal skin discoloration, erythema or ecchymosis.   No nail pitting or clubbing.  Normal wear pattern, color and tone.  No observable or palpable masses of the fingers or palm or wrist.  No palpable triggering of fingers.   No observable or palpable cords or nodules of the fingers or palm.    There is mild swelling in the wrist, hand, forearm.  There is mild tenderness in the wrist.  There is no ecchymosis.  There is no erythema of the surrounding skin.  There is no maceration of the skin.  There is no deformity in the area.  Intact extensors. No extensor lag.    Special tests wrist:    Tinel's negative,    Phalen's negative.    Flexion/compression test equivocal.   Finkelstein's test: negative.       1st carpometacarpal grind: positive     Intact sensation light touch median, radial, ulnar nerves of the hand  Intact sensation to the radial and ulnar digital nerves of the fingers, as well as the finger tips.  Intact epl fpl fdp edc wrist flexion/extension biceps/triceps deltoid  Brisk capillary refill to all fingers.   Palpable radial pulse, 2+.      LEFT HAND/FINGERS:    Skin intact. No abnormal skin discoloration, erythema or ecchymosis.   No nail pitting or clubbing.  Normal wear pattern, color and tone.  No observable or palpable masses of the fingers or palm or wrist.  No palpable triggering of fingers.   No observable or palpable cords or nodules of the fingers or palm.    There is no swelling in the wrist, hand, forearm.  There is no tenderness in the wrist.  There is no ecchymosis.  There is no erythema of the surrounding skin.  There is no maceration of the skin.  There is no gross deformity in the area.  Intact extensors. No extensor lag.    Special tests wrist:    Tinel's negative,    Phalen's negative.    Flexion/compression test negative.   Finkelstein's test: negative.     Intact  sensation light touch median, radial, ulnar nerves of the hand  Intact sensation to the radial and ulnar digital nerves of the fingers, as well as the finger tips.  Intact epl fpl fdp edc wrist flexion/extension biceps/triceps deltoid  Brisk capillary refill to all fingers.   Palpable radial pulse, 2+.      XRAYS: right wrist 10/11/2024  No acute fracture or malalignment. Moderate first carpometacarpal joint degenerative changes. Osteopenia. Mild chronic deformity of the fifth metacarpal.       SPECIAL STUDIES:   EMG RESULTS @ [facility] 11/26/24;  Interpretation of findings;   This is an abnormal study.   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 There is Electrodiagnostic evidence of motor and sensory fiber demyelination without axon loss.  There is no electrodiagnostic evidence of right ulnar neuropathy at the elbow, right cervical radiculopathy, or of a generalized neuropathy affecting the right upper limb.       ASSESSMENT/PLAN: 73yo RHD male with chronic right hand and wrist pain, carpal tunnel syndrome, right thumb carpometacarpal osteoarthritis .    * discussed with patient signs and symptoms consistent with carpal tunnel syndrome. Carpal tunnel syndrome is compression or pinching of the median nerve at the wrist, as it enters the hand. There are many different causes, and in most cases, multifactorial.    * An indepth discussion was had with him about the options for treatment, which included activity modification to avoid aggravating activities, taking breaks during activities that cause symptoms, stretching, NSAIDS to help decrease inflammation and swelling within the carpal tunnel, night splinting, corticosteroid injections, and carpal tunnel release.   * depending upon severity and duration of symptoms, nonoperative treatment is usually initiated, starting with least invasive modalities such as activity modification and a trial of night splints and NSAIDs.  * Cortisone injections are considered to  decrease swelling and inflammation within the carpal tunnel and compression of the nerve.   * Lastly, carpal tunnel release should symptoms persist despite trial of nonoperative treatment, or in cases of severe carpal tunnel syndrome.  * risks of surgery, including, but not limited to: bleeding, infection, pain, scar, damage to adjacent structures (nerves, vessels, tendons), temporary versus permanent nerve injury, failure to relieve symptoms, recurrence of symptoms, incomplete release, stiffness, scar sensitivity and tenderness, need for further surgery, risks of anesthesia were discussed.  * in some cases, with severe, prolonged symptoms, or in situations of underlying peripheral neuropathy, or cervical radiculopahty, there may be permanent nerve changes not amenable to surgery, that even with surgery, may not resolve.  * in some cases, it may take 6 months to a year or longer for symptoms to improve or resolve, but even then may not completely resolve.    * plan: RIGHT open carpal tunnel release, outpatient procedure, MAC with local anesthesia.  * will schedule in future at a mutual convenience  * patient to obtain H+P prior to surgery  * return to clinic 2 weeks postoperative for wound check, suture removal, sooner if needed..  * all patient's questions addressed and answered today.      Navin Jiménez M.D., M.S.  Dept. of Orthopaedic Surgery  [facility]       Again, thank you for allowing me to participate in the care of your patient.        Sincerely,        Navin Jiménez MD

## 2024-12-30 ENCOUNTER — ANESTHESIA EVENT (OUTPATIENT)
Dept: SURGERY | Facility: CLINIC | Age: 74
End: 2024-12-30
Payer: COMMERCIAL

## 2024-12-30 ASSESSMENT — LIFESTYLE VARIABLES: TOBACCO_USE: 0

## 2024-12-30 NOTE — ANESTHESIA PREPROCEDURE EVALUATION
"Anesthesia Pre-Procedure Evaluation    Patient: Tre Swartz   MRN: [id number] : 1950        Procedure : Procedure(s):  RELEASE, right CARPAL TUNNEL          Past Medical History:   Diagnosis Date    Esophageal reflux     GERD      Past Surgical History:   Procedure Laterality Date    FLEXIBLE SIGMOIDOSCOPY  2000    Repeat 5 years      No Known Allergies   Social History     Tobacco Use    Smoking status: Never    Smokeless tobacco: Never   Substance Use Topics    Alcohol use: Not Currently     Comment: rarely      Wt Readings from Last 1 Encounters:   24 87.6 kg (193 lb 3.2 oz)        Anesthesia Evaluation   Pt has had prior anesthetic. Type: MAC.        ROS/MED HX  ENT/Pulmonary: Comment: Chronic interstitial lung disease   (-) tobacco use   Neurologic:       Cardiovascular:       METS/Exercise Tolerance:     Hematologic:     (+) History of blood clots,     anemia,          Musculoskeletal:       GI/Hepatic:     (+) GERD,                   Renal/Genitourinary:       Endo:       Psychiatric/Substance Use:       Infectious Disease:       Malignancy:       Other:               OUTSIDE LABS:  CBC:   Lab Results   Component Value Date    WBC 4.9 2024    WBC 4.8 2012    HGB 12.4 (L) 2024    HGB 13.3 2012    HCT 37.8 (L) 2024    HCT 38.1 (L) 2012     2024     2012     BMP:   Lab Results   Component Value Date     (L) 2024     2020    POTASSIUM 4.9 2024    POTASSIUM 4.2 2020    CHLORIDE 95 (L) 2024    CHLORIDE 104 2020    CO2 28 2024    CO2 31 2020    BUN 14.6 2024    BUN 12 2020    CR 0.89 2024    CR 0.83 2020    GLC 91 2024    GLC 99 2020     COAGS:   Lab Results   Component Value Date    PTT 34 2012    INR 1.10 2012     POC: No results found for: \"BGM\", \"HCG\", \"HCGS\"  HEPATIC:   Lab Results   Component Value Date    ALBUMIN 4.0 " "09/26/2024    PROTTOTAL 8.6 (H) 09/26/2024    ALT 9 09/26/2024    AST 29 09/26/2024    ALKPHOS 81 09/26/2024    BILITOTAL 0.3 09/26/2024     OTHER:   Lab Results   Component Value Date    ANN 9.3 09/26/2024    TSH 0.69 11/15/2004    SED 76 (H) 08/27/2012              David Barnes, STANISLAV CRNA    I have reviewed the pertinent notes and labs in the chart from the past 30 days and (re)examined the patient.  Any updates or changes from those notes are reflected in this note.                         # Overweight: Estimated body mass index is 28.53 kg/m  as calculated from the following:    Height as of 12/11/24: 1.753 m (5' 9\").    Weight as of 12/11/24: 87.6 kg (193 lb 3.2 oz).             "

## 2024-12-31 ENCOUNTER — ANESTHESIA (OUTPATIENT)
Dept: SURGERY | Facility: CLINIC | Age: 74
End: 2024-12-31
Payer: COMMERCIAL

## 2024-12-31 ENCOUNTER — HOSPITAL ENCOUNTER (OUTPATIENT)
Facility: CLINIC | Age: 74
Discharge: HOME | End: 2024-12-31
Attending: ORTHOPAEDIC SURGERY | Admitting: ORTHOPAEDIC SURGERY
Payer: COMMERCIAL

## 2024-12-31 VITALS
OXYGEN SATURATION: 100 % | HEART RATE: 72 BPM | HEIGHT: 69 IN | TEMPERATURE: 98.5 F | SYSTOLIC BLOOD PRESSURE: 135 MMHG | BODY MASS INDEX: 28.61 KG/M2 | DIASTOLIC BLOOD PRESSURE: 72 MMHG | WEIGHT: 193.2 LBS | RESPIRATION RATE: 16 BRPM

## 2024-12-31 DIAGNOSIS — G56.01 RIGHT CARPAL TUNNEL SYNDROME: Primary | ICD-10-CM

## 2024-12-31 PROCEDURE — 710N000012 HC RECOVERY PHASE 2, PER MINUTE: Performed by: ORTHOPAEDIC SURGERY

## 2024-12-31 PROCEDURE — 360N000075 HC SURGERY LEVEL 2, PER MIN: Performed by: ORTHOPAEDIC SURGERY

## 2024-12-31 PROCEDURE — 64721 CARPAL TUNNEL SURGERY: CPT | Mod: RT | Performed by: ORTHOPAEDIC SURGERY

## 2024-12-31 PROCEDURE — 250N000013 HC RX MED GY IP 250 OP 250 PS 637: Performed by: NURSE ANESTHETIST, CERTIFIED REGISTERED

## 2024-12-31 PROCEDURE — 272N000001 HC OR GENERAL SUPPLY STERILE: Performed by: ORTHOPAEDIC SURGERY

## 2024-12-31 PROCEDURE — 999N000141 HC STATISTIC PRE-PROCEDURE NURSING ASSESSMENT: Performed by: ORTHOPAEDIC SURGERY

## 2024-12-31 PROCEDURE — 250N000011 HC RX IP 250 OP 636: Performed by: ORTHOPAEDIC SURGERY

## 2024-12-31 PROCEDURE — 250N000009 HC RX 250: Performed by: ORTHOPAEDIC SURGERY

## 2024-12-31 RX ORDER — LIDOCAINE HYDROCHLORIDE 10 MG/ML
INJECTION, SOLUTION INFILTRATION; PERINEURAL PRN
Status: DISCONTINUED | OUTPATIENT
Start: 2024-12-31 | End: 2024-12-31 | Stop reason: HOSPADM

## 2024-12-31 RX ORDER — HYDROXYZINE HYDROCHLORIDE 10 MG/1
10 TABLET, FILM COATED ORAL
Status: DISCONTINUED | OUTPATIENT
Start: 2024-12-31 | End: 2024-12-31 | Stop reason: HOSPADM

## 2024-12-31 RX ORDER — ONDANSETRON 4 MG/1
4 TABLET, ORALLY DISINTEGRATING ORAL
Status: DISCONTINUED | OUTPATIENT
Start: 2024-12-31 | End: 2024-12-31 | Stop reason: HOSPADM

## 2024-12-31 RX ORDER — ACETAMINOPHEN 325 MG/1
975 TABLET ORAL ONCE
Status: COMPLETED | OUTPATIENT
Start: 2024-12-31 | End: 2024-12-31

## 2024-12-31 RX ORDER — CEFAZOLIN SODIUM/WATER 2 G/20 ML
2 SYRINGE (ML) INTRAVENOUS
Status: DISCONTINUED | OUTPATIENT
Start: 2024-12-31 | End: 2024-12-31

## 2024-12-31 RX ORDER — HYDROCODONE BITARTRATE AND ACETAMINOPHEN 5; 325 MG/1; MG/1
1 TABLET ORAL EVERY 6 HOURS PRN
Qty: 8 TABLET | Refills: 0 | Status: SHIPPED | OUTPATIENT
Start: 2024-12-31

## 2024-12-31 RX ORDER — CEFAZOLIN SODIUM/WATER 2 G/20 ML
2 SYRINGE (ML) INTRAVENOUS SEE ADMIN INSTRUCTIONS
Status: DISCONTINUED | OUTPATIENT
Start: 2024-12-31 | End: 2024-12-31

## 2024-12-31 RX ORDER — HYDROCODONE BITARTRATE AND ACETAMINOPHEN 5; 325 MG/1; MG/1
1 TABLET ORAL
Status: DISCONTINUED | OUTPATIENT
Start: 2024-12-31 | End: 2024-12-31 | Stop reason: HOSPADM

## 2024-12-31 RX ORDER — ONDANSETRON 2 MG/ML
4 INJECTION INTRAMUSCULAR; INTRAVENOUS ONCE
Status: DISCONTINUED | OUTPATIENT
Start: 2024-12-31 | End: 2024-12-31 | Stop reason: HOSPADM

## 2024-12-31 RX ORDER — AMOXICILLIN 250 MG
1-2 CAPSULE ORAL 2 TIMES DAILY
Qty: 30 TABLET | Refills: 0 | Status: SHIPPED | OUTPATIENT
Start: 2024-12-31

## 2024-12-31 RX ORDER — ALBUTEROL SULFATE 0.83 MG/ML
2.5 SOLUTION RESPIRATORY (INHALATION) ONCE
Status: DISCONTINUED | OUTPATIENT
Start: 2024-12-31 | End: 2024-12-31 | Stop reason: HOSPADM

## 2024-12-31 RX ORDER — SODIUM CHLORIDE, SODIUM LACTATE, POTASSIUM CHLORIDE, CALCIUM CHLORIDE 600; 310; 30; 20 MG/100ML; MG/100ML; MG/100ML; MG/100ML
INJECTION, SOLUTION INTRAVENOUS CONTINUOUS
Status: DISCONTINUED | OUTPATIENT
Start: 2024-12-31 | End: 2024-12-31 | Stop reason: HOSPADM

## 2024-12-31 RX ORDER — BUPIVACAINE HYDROCHLORIDE 2.5 MG/ML
INJECTION, SOLUTION INFILTRATION; PERINEURAL PRN
Status: DISCONTINUED | OUTPATIENT
Start: 2024-12-31 | End: 2024-12-31 | Stop reason: HOSPADM

## 2024-12-31 RX ORDER — LIDOCAINE 40 MG/G
CREAM TOPICAL
Status: DISCONTINUED | OUTPATIENT
Start: 2024-12-31 | End: 2024-12-31 | Stop reason: HOSPADM

## 2024-12-31 RX ADMIN — ACETAMINOPHEN 975 MG: 325 TABLET, FILM COATED ORAL at 07:17

## 2024-12-31 ASSESSMENT — ACTIVITIES OF DAILY LIVING (ADL)
ADLS_ACUITY_SCORE: 15

## 2024-12-31 NOTE — DISCHARGE INSTRUCTIONS
Same Day Surgery Discharge Instructions  Special Precautions After Surgery - Adult    It is not unusual to feel lightheaded or faint, up to 24 hours after surgery or while taking pain medication.  If you have these symptoms; sit for a few minutes before standing and have someone assist you when getting up.  You should rest and relax for the next 24 hours and must have someone stay with you for at least 24 hours after your discharge.  DO NOT DRIVE any vehicle or operate mechanical equipment for 24 hours following the end of your surgery.  DO NOT DRIVE while taking narcotic pain medications that have been prescribed by your physician.  If you had a limb operated on, you must be able to use it fully to drive.  DO NOT drink alcoholic beverages for 24 hours following surgery or while taking prescription pain medication.  Drink clear liquids (apple juice, ginger ale, broth, 7-Up, etc.).  Progress to your regular diet as you feel able.  Any questions call your physician and do not make important decisions for 24 hours.    Nausea and Vomiting: Nausea and vomiting can occur any time after receiving anesthesia. If you experience nausea and vomiting we encourage you to move to a clear liquid diet and advance your diet as tolerated. If nausea and vomiting do not improve within 12 hours please call the surgeon or present to the Emergency department.     Break-through Bleeding: If your experience bleeding from your surgical site apply pressure and additional dressing per nurse instruction. For simple problems such as a saturated dressing, you may need to reinforce the dressing with more gauze and tape and put slight pressure on the site. If bleeding does not subside contact the surgeon or present to the Emergency Department.    Post-op Infection: If you develop a fever of 100.4 or greater, have pus like drainage, redness, swelling or severe pain at the surgical site not alleviated with pain medications; please  contact the surgeon or present to the Emergency Department.     Medications:  Hydrocodone (Norco, Vicodin):  Next dose: 11:30.  Follow the instructions on the bottle.  __________________________________________________________________________________________________________________________________  IMPORTANT NUMBERS:    [facility] [place]:  [phone], [phone]  Pharmacy:  [phone]  Same Day Surgery:  [phone], for general post-op questions call Monday - Thursday until 8:30 p.m., Fridays until 6:00 p.m.   Mental Health Mobile Crisis line: [phone]                                                                      [facility], podiatry:  [phone]

## 2024-12-31 NOTE — OR NURSING
[facility] DISCHARGE NOTE    Patient discharged to home at 0950 AM via ambulation. Accompanied by spouse and staff. Discharge instructions reviewed with patient and spouse, opportunity offered to ask questions. Prescriptions filled and sent with patient upon discharge. All belongings sent with patient.    Mirela Roque RN

## 2024-12-31 NOTE — OP NOTE
OPERATIVE REPORT    DATE OF SERVICE:  12/31/2024      PREOPERATIVE DIAGNOSIS  Right carpal tunnel syndrome.      POSTOPERATIVE DIAGNOSIS  Right carpal tunnel syndrome.      NAME OF OPERATION  Open right carpal tunnel release.     SURGEON  Navin Jiménez MD     FIRST ASSISTANT  KARLEY Benitez     ANESTHESIA:  local    ESTIMATED BLOOD LOSS: 1mL    ANTIBIOTICS: none , prior to incision    IVF: 0 ml LR. No iv placed.    FINDINGS: thick transverse carpal ligament, flattened median nerve, hyperemic changes of median nerve.    Tourniquet time: 4 minutes at 200 mmHg.    Complications: None apparent.    SPECIMENS: none    DRAINS: none    IMPLANTS: none    INDICATIONS  Tre Swartz is a 74 year old male with   right carpal tunnel syndrome, confirmed on EMG. The symptoms have been quite bothersome.  Conservative treatment has failed, including night splints, therapy, NSAIDs and injections. Risks of surgery, including but not limited to: bleeding, infection, pain, scar, damage to adjacent structures (nerves, vessels), temporary vs permanent nerve damage, failure to relieve symptoms, recurrence of symptoms, need for further surgery, risks of anesthesia, and death were discussed. All questions were addressed to patient satisfaction. The patient elected to proceed with this surgery understanding the risks and perceived benefits. Informed consent was obtained for the procedure.       PROCEDURE IN DETAIL  The patient was identified in the preoperative holding area. The correct procedure and procedural site was confirmed with the patient. Consent was reviewed. The correct surgical site was marked with an indelible marker by the attending surgeon, Navin Jiménez MD.  The patient was then taken to the operating room and placed on the operating table in the supine position.  Tourniquet was placed around the proximal arm. All bony prominences well padded. The limb was prepped and draped in the usual sterile fashion.   Local  anesthetic using a combination of 0.25% Marcaine and 1% lidocaine was injected in the anticipated incision site.  The limb was exsanguinated and tourniquet inflated to 200 mmHg.    A longitudinal incision was made in the usual location at the base of the palm just ulnar to midline and the palmaris longus.  The incision was taken down through the skin and subcutaneous tissue carefully to the level of the palmar fascia.  The palmar fascia was then carefully incised, and the transverse carpal ligament was then exposed. The transverse carpal ligament was incised from distal to proximal under direct vision while protecting underlying structures with an elevator.  The transverse carpal ligament was tight and thick.  Proximally, we protected the underlying structures using a clamp, and then completed the ligament incision using a tenotomy scissors.  The distal volar forearm fascia was also incised longitudinally for a segment of approximately 3cm. The median nerve was completely exposed and was noted to have flattened, hyperemic appearance. Once I felt that the transverse carpal ligament was released completely, I used my small finger as a probe to make sure that it was in fact completely released, as it was. The wound was copiously irrigated with normal saline and the tourniquet deflated.  Hemostasis was achieved with electrocautery.  Then, 4-0 nylon sutures placed in a horizontal mattress fashion were used to close the skin, and a sterile dressing was applied followed by a volar splint. The patient was then transferred to the hospital cart and to the recovery area in stable condition. There were no apparent complications.    Postop plan will be partial weight-bearing of right upper extremity. Splint to be in place at all times until follow-up in 10-14 days. Oral pain medications ( Norco) will be provided. Elevation of right upper extremity at all times to reduce swelling. Finger range of motion.    Navin Jiménez M.D.,  M.S.  Dept. of Orthopaedic Surgery  [facility]

## 2024-12-31 NOTE — INTERVAL H&P NOTE
"I have reviewed the surgical (or preoperative) H&P that is linked to this encounter, and examined the patient. There are no significant changes    Clinical Conditions Present on Arrival:  Clinically Significant Risk Factors Present on Admission                       # Overweight: Estimated body mass index is 28.53 kg/m  as calculated from the following:    Height as of 12/11/24: 1.753 m (5' 9\").    Weight as of 12/11/24: 87.6 kg (193 lb 3.2 oz).     The History and Physical on patient's chart was personally reviewed today with the patient. there have been no interval changes in patient's history since seen 12/11/2024.    History:  Tre Swartz is a 74 year old male , Right -hand dominant with  Right hand numbness and tingling, pain, and weakness x 6 months, ~6/2024. No injury. Wrist feels like pin cushion, pins poking, numbness tip of the fingers. If he uses the right hand will have pain the next day. Feels like his  strength is worsening, difficulties with grasping, holding things. His hand will shake. He was told he had carpal tunnel syndrome, had EMG with [facility] 11/26/2024.     He has numbness and tingling in radial 4 digits.        Worse in the morning, night.  Can't make a tight fist.     Wears a brace at night, does help quite a bit.     Seldom use of advil as needed.    EMG RESULTS @ [facility] 11/26/24;  Interpretation of findings;   This is an abnormal study.   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 There is Electrodiagnostic evidence of motor and sensory fiber demyelination without axon loss.  There is no electrodiagnostic evidence of right ulnar neuropathy at the elbow, right cervical radiculopathy, or of a generalized neuropathy affecting the right upper limb.         ASSESSMENT/PLAN: 75yo RHD male with chronic right hand and wrist pain, carpal tunnel syndrome, right thumb carpometacarpal osteoarthritis .     * discussed with patient signs and symptoms consistent with carpal " tunnel syndrome. Carpal tunnel syndrome is compression or pinching of the median nerve at the wrist, as it enters the hand. There are many different causes, and in most cases, multifactorial.     * An indepth discussion was had with him about the options for treatment, which included activity modification to avoid aggravating activities, taking breaks during activities that cause symptoms, stretching, NSAIDS to help decrease inflammation and swelling within the carpal tunnel, night splinting, corticosteroid injections, and carpal tunnel release.   * depending upon severity and duration of symptoms, nonoperative treatment is usually initiated, starting with least invasive modalities such as activity modification and a trial of night splints and NSAIDs.  * Cortisone injections are considered to decrease swelling and inflammation within the carpal tunnel and compression of the nerve.   * Lastly, carpal tunnel release should symptoms persist despite trial of nonoperative treatment, or in cases of severe carpal tunnel syndrome.  * risks of surgery, including, but not limited to: bleeding, infection, pain, scar, damage to adjacent structures (nerves, vessels, tendons), temporary versus permanent nerve injury, failure to relieve symptoms, recurrence of symptoms, incomplete release, stiffness, scar sensitivity and tenderness, need for further surgery, risks of anesthesia were discussed.  * in some cases, with severe, prolonged symptoms, or in situations of underlying peripheral neuropathy, or cervical radiculopahty, there may be permanent nerve changes not amenable to surgery, that even with surgery, may not resolve.  * in some cases, it may take 6 months to a year or longer for symptoms to improve or resolve, but even then may not completely resolve.     * plan: RIGHT open carpal tunnel release, outpatient procedure     Patient elects to proceed with planned procedure. Right carpal tunnel release.    Risks and perceived  benefits of surgery again discussed with patient. Patient's questions addressed and answered. Written informed consent obtained and reviewed. Surgical site marked with indelible marker with patient's participation after confirming site with patient.      Navin Jiménez M.D., M.S.  Dept. of Orthopaedic Surgery  [facility]

## 2025-01-02 ENCOUNTER — TELEPHONE (OUTPATIENT)
Dept: SURGERY | Facility: CLINIC | Age: 75
End: 2025-01-02
Payer: COMMERCIAL

## 2025-01-02 NOTE — TELEPHONE ENCOUNTER
Called and notified patient that 1/9/25 is too soon. He will keep appointmennt on 1/14/25 with Dr. Patrick.     Jamee RICHARD RN, Specialty Clinic 01/02/25 2:51 PM

## 2025-01-02 NOTE — TELEPHONE ENCOUNTER
Appointment     Reason for Call: Patient is requesting to be scheduled sooner than next available    Reason for visit: pt is asking if they come in to see Dr. Wiggins on 1/9 would that be too early to have stitches removed?  Ortho con does not schedule post op visits.      Is this a new or return visit: Return    Have you been treated for this in the past? Yes    Additional comments: Pt is asking to have post op visit moved to [place] or even [place].  They would prefer to stay with Dr. Jiménez.     Could we send this information to you in [employer] or would you prefer to receive a phone call?:   Patient would like to be contacted via [employer]

## 2025-01-09 NOTE — PROGRESS NOTES
Tre Swartz is here for follow up after open right carpal tunnel release on 12/31/24 byr Dr Jiménez.   Numbness and tingling have decreased since surgery.    Pain: minimal.     Exam:  Wound looks good, no evidence of infection.  Non-tender over the incision site.  Able to make a full fist    Assessment:  Doing well status post right carpal tunnel release     Plan:  Sutures were removed today.  Taught scar management techniques.  Discussed splint wear.  Therapy: not needed  Can return to repetitive use at 4-6 weeks.  Return to clinic as needed       JOSIE Patrick MD  Dept. Orthopedic Surgery  [facility]

## 2025-01-14 ENCOUNTER — OFFICE VISIT (OUTPATIENT)
Dept: ORTHOPEDICS | Facility: CLINIC | Age: 75
End: 2025-01-14
Payer: COMMERCIAL

## 2025-01-14 VITALS — DIASTOLIC BLOOD PRESSURE: 78 MMHG | SYSTOLIC BLOOD PRESSURE: 138 MMHG | HEART RATE: 87 BPM | OXYGEN SATURATION: 97 %

## 2025-01-14 DIAGNOSIS — G56.01 CARPAL TUNNEL SYNDROME OF RIGHT WRIST: Primary | ICD-10-CM

## 2025-01-14 PROCEDURE — 99024 POSTOP FOLLOW-UP VISIT: CPT | Performed by: ORTHOPAEDIC SURGERY

## 2025-01-14 ASSESSMENT — PAIN SCALES - GENERAL: PAINLEVEL_OUTOF10: MILD PAIN (3)

## 2025-01-14 NOTE — LETTER
1/14/2025      Tre Swartz  [id number] [address] [address]      Dear Colleague,    Thank you for referring your patient, Tre Swartz, to the [facility]. Please see a copy of my visit note below.    Tre Swartz is here for follow up after open right carpal tunnel release on 12/31/24 byr Dr Jiménez.   Numbness and tingling have decreased since surgery.    Pain: minimal.     Exam:  Wound looks good, no evidence of infection.  Non-tender over the incision site.  Able to make a full fist    Assessment:  Doing well status post right carpal tunnel release     Plan:  Sutures were removed today.  Taught scar management techniques.  Discussed splint wear.  Therapy: not needed  Can return to repetitive use at 4-6 weeks.  Return to clinic as needed       JOSIE Patrick MD  Dept. Orthopedic Surgery  [facility]      Again, thank you for allowing me to participate in the care of your patient.        Sincerely,        David Patrick MD    Electronically signed

## (undated) DEVICE — GLOVE BIOGEL PI MICRO SZ 8.0 48580

## (undated) DEVICE — PREP POVIDONE IODINE SOLUTION 10% 120ML

## (undated) DEVICE — GOWN XLG DISP 9545

## (undated) DEVICE — CAST PADDING 4" UNSTERILE 9044

## (undated) DEVICE — IMM ALUMI HAND XLG 761

## (undated) DEVICE — SYR 10ML LL W/O NDL

## (undated) DEVICE — GLOVE BIOGEL PI MICRO SZ 7.5 48575

## (undated) DEVICE — PACK HAND

## (undated) DEVICE — GOWN IMPERVIOUS SPECIALTY XLG/XLONG 32474

## (undated) DEVICE — DRSG ADAPTIC 3X3" 6112

## (undated) DEVICE — NDL 25GA 1.5" 305127

## (undated) DEVICE — BNDG ELASTIC 3"X5YDS UNSTERILE 6611-30

## (undated) DEVICE — SOL WATER IRRIG 1000ML BOTTLE 07139-09

## (undated) DEVICE — SLING ARM MED 79-99155

## (undated) DEVICE — GLOVE BIOGEL PI MICRO INDICATOR UNDERGLOVE SZ 7.5 48975

## (undated) DEVICE — NDL 18GA 1.5" 305196

## (undated) DEVICE — CUFF TOURN 18IN STRL DISP

## (undated) DEVICE — SU ETHILON 4-0 FS-1 1629H

## (undated) DEVICE — PREP CHLORAPREP 26ML TINTED ORANGE  260815

## (undated) DEVICE — CAST PLASTER SPLINT 4X15" 7394

## (undated) RX ORDER — ACETAMINOPHEN 325 MG/1
TABLET ORAL
Status: DISPENSED
Start: 2024-12-31